# Patient Record
Sex: MALE | Race: WHITE | NOT HISPANIC OR LATINO | Employment: UNEMPLOYED | URBAN - METROPOLITAN AREA
[De-identification: names, ages, dates, MRNs, and addresses within clinical notes are randomized per-mention and may not be internally consistent; named-entity substitution may affect disease eponyms.]

---

## 2023-03-13 ENCOUNTER — OFFICE VISIT (OUTPATIENT)
Dept: INTERNAL MEDICINE | Facility: CLINIC | Age: 53
End: 2023-03-13
Payer: MEDICAID

## 2023-03-13 VITALS
RESPIRATION RATE: 18 BRPM | HEIGHT: 71 IN | HEART RATE: 78 BPM | TEMPERATURE: 98.4 F | SYSTOLIC BLOOD PRESSURE: 152 MMHG | DIASTOLIC BLOOD PRESSURE: 102 MMHG

## 2023-03-13 DIAGNOSIS — Z13.9 ENCOUNTER FOR SCREENING: ICD-10-CM

## 2023-03-13 DIAGNOSIS — L30.4 INTERTRIGO: ICD-10-CM

## 2023-03-13 DIAGNOSIS — Z12.5 SCREENING PSA (PROSTATE SPECIFIC ANTIGEN): ICD-10-CM

## 2023-03-13 DIAGNOSIS — I10 PRIMARY HYPERTENSION: ICD-10-CM

## 2023-03-13 DIAGNOSIS — M51.37 DEGENERATIVE DISC DISEASE AT L5-S1 LEVEL: ICD-10-CM

## 2023-03-13 DIAGNOSIS — M12.812 ROTATOR CUFF ARTHROPATHY OF BOTH SHOULDERS: ICD-10-CM

## 2023-03-13 DIAGNOSIS — M12.811 ROTATOR CUFF ARTHROPATHY OF BOTH SHOULDERS: ICD-10-CM

## 2023-03-13 DIAGNOSIS — E03.8 OTHER SPECIFIED HYPOTHYROIDISM: ICD-10-CM

## 2023-03-13 DIAGNOSIS — E66.01 CLASS 3 SEVERE OBESITY DUE TO EXCESS CALORIES WITH SERIOUS COMORBIDITY AND BODY MASS INDEX (BMI) OF 40.0 TO 44.9 IN ADULT: ICD-10-CM

## 2023-03-13 DIAGNOSIS — Z12.11 SCREEN FOR COLON CANCER: ICD-10-CM

## 2023-03-13 DIAGNOSIS — Z00.00 HEALTHCARE MAINTENANCE: Primary | ICD-10-CM

## 2023-03-13 PROBLEM — M51.379 DEGENERATIVE DISC DISEASE AT L5-S1 LEVEL: Status: ACTIVE | Noted: 2023-03-13

## 2023-03-13 PROBLEM — M54.50 CHRONIC MIDLINE LOW BACK PAIN WITHOUT SCIATICA: Status: ACTIVE | Noted: 2023-03-13

## 2023-03-13 PROBLEM — E66.813 CLASS 3 SEVERE OBESITY DUE TO EXCESS CALORIES WITH SERIOUS COMORBIDITY AND BODY MASS INDEX (BMI) OF 40.0 TO 44.9 IN ADULT: Status: ACTIVE | Noted: 2023-03-13

## 2023-03-13 PROBLEM — G89.29 CHRONIC MIDLINE LOW BACK PAIN WITHOUT SCIATICA: Status: ACTIVE | Noted: 2023-03-13

## 2023-03-13 LAB
EXPIRATION DATE: NORMAL
HBA1C MFR BLD: 5.5 %
Lab: NORMAL

## 2023-03-13 PROCEDURE — 3044F HG A1C LEVEL LT 7.0%: CPT | Performed by: STUDENT IN AN ORGANIZED HEALTH CARE EDUCATION/TRAINING PROGRAM

## 2023-03-13 PROCEDURE — 3080F DIAST BP >= 90 MM HG: CPT | Performed by: STUDENT IN AN ORGANIZED HEALTH CARE EDUCATION/TRAINING PROGRAM

## 2023-03-13 PROCEDURE — 1160F RVW MEDS BY RX/DR IN RCRD: CPT | Performed by: STUDENT IN AN ORGANIZED HEALTH CARE EDUCATION/TRAINING PROGRAM

## 2023-03-13 PROCEDURE — 3077F SYST BP >= 140 MM HG: CPT | Performed by: STUDENT IN AN ORGANIZED HEALTH CARE EDUCATION/TRAINING PROGRAM

## 2023-03-13 PROCEDURE — 1159F MED LIST DOCD IN RCRD: CPT | Performed by: STUDENT IN AN ORGANIZED HEALTH CARE EDUCATION/TRAINING PROGRAM

## 2023-03-13 PROCEDURE — G0103 PSA SCREENING: HCPCS | Performed by: STUDENT IN AN ORGANIZED HEALTH CARE EDUCATION/TRAINING PROGRAM

## 2023-03-13 PROCEDURE — 3008F BODY MASS INDEX DOCD: CPT | Performed by: STUDENT IN AN ORGANIZED HEALTH CARE EDUCATION/TRAINING PROGRAM

## 2023-03-13 PROCEDURE — 83036 HEMOGLOBIN GLYCOSYLATED A1C: CPT | Performed by: STUDENT IN AN ORGANIZED HEALTH CARE EDUCATION/TRAINING PROGRAM

## 2023-03-13 PROCEDURE — 84443 ASSAY THYROID STIM HORMONE: CPT | Performed by: STUDENT IN AN ORGANIZED HEALTH CARE EDUCATION/TRAINING PROGRAM

## 2023-03-13 PROCEDURE — 2014F MENTAL STATUS ASSESS: CPT | Performed by: STUDENT IN AN ORGANIZED HEALTH CARE EDUCATION/TRAINING PROGRAM

## 2023-03-13 PROCEDURE — 80053 COMPREHEN METABOLIC PANEL: CPT | Performed by: STUDENT IN AN ORGANIZED HEALTH CARE EDUCATION/TRAINING PROGRAM

## 2023-03-13 PROCEDURE — 86803 HEPATITIS C AB TEST: CPT | Performed by: STUDENT IN AN ORGANIZED HEALTH CARE EDUCATION/TRAINING PROGRAM

## 2023-03-13 PROCEDURE — 80061 LIPID PANEL: CPT | Performed by: STUDENT IN AN ORGANIZED HEALTH CARE EDUCATION/TRAINING PROGRAM

## 2023-03-13 PROCEDURE — 99386 PREV VISIT NEW AGE 40-64: CPT | Performed by: STUDENT IN AN ORGANIZED HEALTH CARE EDUCATION/TRAINING PROGRAM

## 2023-03-13 RX ORDER — CLOTRIMAZOLE 1 %
1 CREAM (GRAM) TOPICAL 2 TIMES DAILY
Qty: 56 G | Refills: 0 | Status: SHIPPED | OUTPATIENT
Start: 2023-03-13 | End: 2023-04-10

## 2023-03-13 RX ORDER — MELOXICAM 7.5 MG/1
7.5 TABLET ORAL DAILY
Qty: 30 TABLET | Refills: 1 | Status: SHIPPED | OUTPATIENT
Start: 2023-03-13 | End: 2023-03-13

## 2023-03-13 RX ORDER — TELMISARTAN 40 MG/1
40 TABLET ORAL DAILY
Qty: 30 TABLET | Refills: 1 | Status: SHIPPED | OUTPATIENT
Start: 2023-03-13 | End: 2023-03-24 | Stop reason: SDUPTHER

## 2023-03-13 RX ORDER — IBUPROFEN 200 MG
200 TABLET ORAL EVERY 6 HOURS PRN
COMMUNITY
End: 2023-03-13

## 2023-03-13 RX ORDER — CLOTRIMAZOLE 1 %
1 CREAM (GRAM) TOPICAL 2 TIMES DAILY
Qty: 56 G | Refills: 0 | Status: SHIPPED | OUTPATIENT
Start: 2023-03-13 | End: 2023-03-13

## 2023-03-13 RX ORDER — MELOXICAM 7.5 MG/1
7.5 TABLET ORAL DAILY
Qty: 30 TABLET | Refills: 1 | Status: SHIPPED | OUTPATIENT
Start: 2023-03-13 | End: 2023-03-24 | Stop reason: SDUPTHER

## 2023-03-13 RX ORDER — ACETAMINOPHEN 500 MG
500 TABLET ORAL EVERY 6 HOURS PRN
COMMUNITY
End: 2023-03-13

## 2023-03-13 RX ORDER — TELMISARTAN 40 MG/1
40 TABLET ORAL DAILY
Qty: 30 TABLET | Refills: 1 | Status: SHIPPED | OUTPATIENT
Start: 2023-03-13 | End: 2023-03-13

## 2023-03-13 NOTE — PATIENT INSTRUCTIONS
"DASH Eating Plan  DASH stands for Dietary Approaches to Stop Hypertension. The DASH eating plan is a healthy eating plan that has been shown to:  Reduce high blood pressure (hypertension).  Reduce your risk for type 2 diabetes, heart disease, and stroke.  Help with weight loss.  What are tips for following this plan?  Reading food labels  Check food labels for the amount of salt (sodium) per serving. Choose foods with less than 5 percent of the Daily Value of sodium. Generally, foods with less than 300 milligrams (mg) of sodium per serving fit into this eating plan.  To find whole grains, look for the word \"whole\" as the first word in the ingredient list.  Shopping  Buy products labeled as \"low-sodium\" or \"no salt added.\"  Buy fresh foods. Avoid canned foods and pre-made or frozen meals.  Cooking  Avoid adding salt when cooking. Use salt-free seasonings or herbs instead of table salt or sea salt. Check with your health care provider or pharmacist before using salt substitutes.  Do not [place] foods. Cook foods using healthy methods such as baking, boiling, grilling, roasting, and broiling instead.  Cook with heart-healthy oils, such as olive, canola, avocado, soybean, or sunflower oil.  Meal planning  Eat a balanced diet that includes:  4 or more servings of fruits and 4 or more servings of vegetables each day. Try to fill one-half of your plate with fruits and vegetables.  6-8 servings of whole grains each day.  Less than 6 oz (170 g) of lean meat, poultry, or fish each day. A 3-oz (85-g) serving of meat is about the same size as a deck of cards. One egg equals 1 oz (28 g).  2-3 servings of low-fat dairy each day. One serving is 1 cup (237 mL).  1 serving of nuts, seeds, or beans 5 times each week.  2-3 servings of heart-healthy fats. Healthy fats called omega-3 fatty acids are found in foods such as walnuts, flaxseeds, fortified milks, and eggs. These fats are also found in cold-water fish, such as sardines, salmon, " and mackerel.  Limit how much you eat of:  Canned or prepackaged foods.  Food that is high in trans fat, such as some fried foods.  Food that is high in saturated fat, such as fatty meat.  Desserts and other sweets, sugary drinks, and other foods with added sugar.  Full-fat dairy products.  Do not salt foods before eating.  Do not eat more than 4 egg yolks a week.  Try to eat at least 2 vegetarian meals a week.  Eat more home-cooked food and less restaurant, buffet, and fast food.  Lifestyle  When eating at a restaurant, ask that your food be prepared with less salt or no salt, if possible.  If you drink alcohol:  Limit how much you use to:  0-1 drink a day for women who are not pregnant.  0-2 drinks a day for men.  Be aware of how much alcohol is in your drink. In the [place] one drink equals one 12 oz bottle of beer (355 mL), one 5 oz glass of wine (148 mL), or one 1½ oz glass of hard liquor (44 mL).  General information  Avoid eating more than 2,300 mg of salt a day. If you have hypertension, you may need to reduce your sodium intake to 1,500 mg a day.  Work with your health care provider to maintain a healthy body weight or to lose weight. Ask what an ideal weight is for you.  Get at least 30 minutes of exercise that causes your heart to beat faster (aerobic exercise) most days of the week. Activities may include walking, swimming, or biking.  Work with your health care provider or dietitian to adjust your eating plan to your individual calorie needs.  What foods should I eat?  Fruits  All fresh, dried, or frozen fruit. Canned fruit in natural juice (without added sugar).  Vegetables  Fresh or frozen vegetables (raw, steamed, roasted, or grilled). Low-sodium or reduced-sodium tomato and vegetable juice. Low-sodium or reduced-sodium tomato sauce and tomato paste. Low-sodium or reduced-sodium canned vegetables.  Grains  Whole-grain or whole-wheat bread. Whole-grain or whole-wheat pasta. Brown rice. Oatmeal. Quinoa.  Bulgur. Whole-grain and low-sodium cereals. Deena bread. Low-fat, low-sodium crackers. Whole-wheat flour tortillas.  Meats and other proteins  Skinless chicken or turkey. Ground chicken or turkey. Pork with fat trimmed off. Fish and seafood. Egg whites. Dried beans, peas, or lentils. Unsalted nuts, nut butters, and seeds. Unsalted canned beans. Lean cuts of beef with fat trimmed off. Low-sodium, lean precooked or cured meat, such as sausages or meat loaves.  Dairy  Low-fat (1%) or fat-free (skim) milk. Reduced-fat, low-fat, or fat-free cheeses. Nonfat, low-sodium ricotta or cottage cheese. Low-fat or nonfat yogurt. Low-fat, low-sodium cheese.  Fats and oils  Soft margarine without trans fats. Vegetable oil. Reduced-fat, low-fat, or light mayonnaise and salad dressings (reduced-sodium). Canola, safflower, olive, avocado, soybean, and sunflower oils. Avocado.  Seasonings and condiments  Herbs. Spices. Seasoning mixes without salt.  Other foods  Unsalted popcorn and pretzels. Fat-free sweets.  The items listed above may not be a complete list of foods and beverages you can eat. Contact a dietitian for more information.  What foods should I avoid?  Fruits  Canned fruit in a light or heavy syrup. Fried fruit. Fruit in cream or butter sauce.  Vegetables  Creamed or fried vegetables. Vegetables in a cheese sauce. Regular canned vegetables (not low-sodium or reduced-sodium). Regular canned tomato sauce and paste (not low-sodium or reduced-sodium). Regular tomato and vegetable juice (not low-sodium or reduced-sodium). Pickles. Olives.  Grains  Baked goods made with fat, such as croissants, muffins, or some breads. Dry pasta or rice meal packs.  Meats and other proteins  Fatty cuts of meat. Ribs. Fried meat. Nagel. Bologna, salami, and other precooked or cured meats, such as sausages or meat loaves. Fat from the back of a pig (fatback). Bratwurst. Salted nuts and seeds. Canned beans with added salt. Canned or smoked fish.  Whole eggs or egg yolks. Chicken or turkey with skin.  Dairy  Whole or 2% milk, cream, and half-and-half. Whole or full-fat cream cheese. Whole-fat or sweetened yogurt. Full-fat cheese. Nondairy creamers. Whipped toppings. Processed cheese and cheese spreads.  Fats and oils  Butter. Stick margarine. Lard. Shortening. Ghee. Nagel fat. Tropical oils, such as coconut, palm kernel, or palm oil.  Seasonings and condiments  Onion salt, garlic salt, seasoned salt, table salt, and sea salt. Worcestershire sauce. Tartar sauce. Barbecue sauce. Teriyaki sauce. Soy sauce, including reduced-sodium. Steak sauce. Canned and packaged gravies. Fish sauce. Oyster sauce. Cocktail sauce. Store-bought horseradish. Ketchup. Mustard. Meat flavorings and tenderizers. Bouillon cubes. Hot sauces. Pre-made or packaged marinades. Pre-made or packaged taco seasonings. Relishes. Regular salad dressings.  Other foods  Salted popcorn and pretzels.  The items listed above may not be a complete list of foods and beverages you should avoid. Contact a dietitian for more information.  Where to find more information  National Heart, Lung, and Blood [place]: www.nhlbi.nih.gov  American Heart Association: www.heart.org  Academy of Nutrition and Dietetics: www.eatright.org  National Kidney Foundation: www.kidney.org  Summary  The DASH eating plan is a healthy eating plan that has been shown to reduce high blood pressure (hypertension). It may also reduce your risk for type 2 diabetes, heart disease, and stroke.  When on the DASH eating plan, aim to eat more fresh fruits and vegetables, whole grains, lean proteins, low-fat dairy, and heart-healthy fats.  With the DASH eating plan, you should limit salt (sodium) intake to 2,300 mg a day. If you have hypertension, you may need to reduce your sodium intake to 1,500 mg a day.  Work with your health care provider or dietitian to adjust your eating plan to your individual calorie needs.  This information is not  intended to replace advice given to you by your health care provider. Make sure you discuss any questions you have with your health care provider.  Document Revised: 11/20/2020 Document Reviewed: 11/20/2020  Elsevier Patient Education © 2022 [employer] Inc.

## 2023-03-13 NOTE — PROGRESS NOTES
New Patient Office Visit      Date: 2023   Patient Name: Greg Kee  : 1970   MRN: [id number]     Chief Complaint:    Chief Complaint   Patient presents with   • Back Pain   • Shoulder Pain     Establish care.    • Hypertension       History of Present Illness: Greg Kee is a 52 y.o. male who is here today to establish care.      Hypertension  The patient reports that his blood pressure ranges from 140 mmHg to 160 mmHg on the systolic portion. He states that he did not pass your 's test due to his blood pressure being elevated.     Headache  The patient complains of headaches often. He states that he is not aware if it is associated with his blood pressure.     Back pain  The patient reports that the pain is getting worse. He states that he can not walk more than half a mile due to his back pain. He confirms that the back pain has been present for 2 years or more. He denies of any injury. The patient states that his family doctor in [place] was Dr. Hughes. He reports that he had completed a CAT scan, MRI, and x-rays. He states that the report read how his nerve on L5 and 2 more nerves that were severely damaged. The patient was offered to get surgery or a pain injection. He denies getting the treatment. The patient inquires about rechecking his back and getting treatment. He confirms that he has done physical therapy once. He states the physical therapy did not help the pain. He inquires about getting medication due to taking 8 200mg tablets of ibuprofen daily. The patient states the ibuprofen is not working.     Shoulder pain  The patient reports that Dr. Wallace gives an injection in his bilateral shoulders every summer. He states that he works 25 hour days with no lunch. He notes that the pain is getting worse. He inquires about treatment and getting injections. The patient complains of getting no sleep due to the pain. He notes that he has used Voltran gel that  has helped alleviate some of the pain.     Rash  The patient reports that he has a rash in his groin area. He states that he has seen a dermatologist previously. The dermatologist did not know what the rash was according to the patient. He has tried several medications which has not helped. He denies the medication helping the irritation.     Social  He notes that he moved here from [place]. 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 The patient states that he lives in [place]. He confirms he is not a smoker.     Health maintenance  The patient states that he would like the physician to get all his records. He denies COVID-19, tetanus, shingles, and influenza vaccinations. He denies any colon cancer screening. He inquires about the colon screening procedure. The patient reports taking his wife's thyroid medication. He states that when he does not take it, he does not have a bowel movement. He denies of having any thyroid issues. He denies of having any issues with going to the restroom. He inquires about checking his thyroid. The patient states that he drink apple cider daily. He denies of having a dentist and ophthalmologist. He states that denies cataract surgery. The patient has earrings aids but does not wear them. He does not diet and exercise. He inquires about gastric bypass.       Subjective      Review of Systems:   Review of Systems   Constitutional: Negative for activity change, appetite change, fatigue and fever.   Eyes: Negative for blurred vision, photophobia and visual disturbance.   Respiratory: Negative for cough, chest tightness and shortness of breath.    Cardiovascular: Negative for chest pain and palpitations.   Gastrointestinal: Negative for abdominal distention, abdominal pain, blood in stool, constipation, diarrhea, nausea and vomiting.   Genitourinary: Negative for dysuria and hematuria.   Musculoskeletal: Positive for arthralgias and back pain. Negative for joint swelling.   Skin: Positive for rash.  Negative for wound.   Neurological: Positive for headache. Negative for weakness and confusion.       Past Medical History: History reviewed. No pertinent past medical history.    Past Surgical History: History reviewed. No pertinent surgical history.    Family History:   Family History   Problem Relation Age of Onset   • Prostate cancer Father        Social History:   Social History     Socioeconomic History   • Marital status:    Tobacco Use   • Smoking status: Never     Passive exposure: Never   • Smokeless tobacco: Never   Vaping Use   • Vaping Use: Never used   Substance and Sexual Activity   • Alcohol use: Never   • Drug use: Never       Medications:     Current Outpatient Medications:   •  clotrimazole (LOTRIMIN) 1 % cream, Apply 1 application topically to the appropriate area as directed 2 (Two) Times a Day for 28 days., Disp: 56 g, Rfl: 0  •  meloxicam (Mobic) 7.5 MG tablet, Take 1 tablet by mouth Daily., Disp: 30 tablet, Rfl: 1  •  telmisartan (MICARDIS) 40 MG tablet, Take 1 tablet by mouth Daily., Disp: 30 tablet, Rfl: 1    Allergies:   No Known Allergies    Immunizations:    There is no immunization history on file for this patient.     Colorectal Screening: Up-to-date  Last Completed Colonoscopy     This patient has no relevant Health Maintenance data.        CT for Smoker (Age 50-80, 20pk yr within last 15 years): N/A  Bone Density/DEXA (high risk): Not applicable  Hep C (Age 18-79 once): ordered  HIV (Age 15-65 once) : N/A  PSA (Over age 50, C Level Recommendation): Ordered  US Aorta (For male smokers, age 65): Not applicable  A1c: Ordered  Lipid panel: Ordered      Dermatology: declined  Ophthalmologist: declined  Dentist: declined    Tobacco Use: Low Risk    • Smoking Tobacco Use: Never   • Smokeless Tobacco Use: Never   • Passive Exposure: Never       Social History     Substance and Sexual Activity   Alcohol Use Never        Social History     Substance and Sexual Activity   Drug Use Never  "       Diet/Physical activity: Counseled on 03/14/23    Sexual health: No concerns, contraception used    PHQ-2 Depression Screening  PHQ-9 Total Score: 0       Intimate partner violence: (Screen on initial visit, older adult with injury or evidence of neglect): No concerns  • Violence can be a problem in many people's lives, so I now ask every patient about trauma or abuse they may have experienced in a relationship.  • Stress/Safety - Do you feel safe in your relationship?  • Afraid/Abused - Have you ever been in a relationship where you were threatened, hurt, or afraid?  • Friend/Family - Are your friends aware you have been hurt?  • Emergency Plan - Do you have a safe place to go and the resources you need in an emergency?    Osteoporosis: No concerns  • Men: history of low trauma fracture, androgen deprivation therapy for prostate cancer, hypogonadism, primary hyperparathyroidism, intestinal disorders.     Objective     Physical Exam:  Vital Signs:   Vitals:    03/13/23 1104   BP: (!) 152/102   BP Location: Right arm   Patient Position: Sitting   Cuff Size: Adult   Pulse: 78   Resp: 18   Temp: 98.4 °F (36.9 °C)   TempSrc: Temporal   Height: 180.3 cm (71\")   PainSc:   4   PainLoc: Back     There is no height or weight on file to calculate BMI.    Physical Exam  Vitals and nursing note reviewed.   Constitutional:       General: He is not in acute distress.     Appearance: Normal appearance. He is obese. He is not ill-appearing or toxic-appearing.   HENT:      Nose: No congestion or rhinorrhea.   Eyes:      General:         Right eye: No discharge.         Left eye: No discharge.      Conjunctiva/sclera: Conjunctivae normal.   Cardiovascular:      Rate and Rhythm: Normal rate and regular rhythm.      Heart sounds: Normal heart sounds. No murmur heard.    No friction rub.   Pulmonary:      Effort: Pulmonary effort is normal. No respiratory distress.      Breath sounds: Normal breath sounds. No wheezing or rhonchi. "   Abdominal:      General: Abdomen is flat. Bowel sounds are normal. There is no distension.      Palpations: Abdomen is soft. There is no mass.      Tenderness: There is no abdominal tenderness. There is no guarding or rebound.   Musculoskeletal:      Cervical back: Normal range of motion.      Right lower leg: No edema.      Left lower leg: No edema.   Skin:     Findings: No lesion or rash.   Neurological:      General: No focal deficit present.      Mental Status: He is alert. Mental status is at baseline.      Coordination: Coordination normal.      Gait: Gait normal.   Psychiatric:         Mood and Affect: Mood normal.         Behavior: Behavior normal.         Thought Content: Thought content normal.         Judgment: Judgment normal.         Procedures    Results:     Labs:   Hemoglobin A1C   Date Value Ref Range Status   03/13/2023 5.5 % Final     TSH   Date Value Ref Range Status   03/13/2023 1.680 0.270 - 4.200 uIU/mL Final        Imaging:   No valid procedures specified.     Assessment / Plan      Assessment/Plan:   Problem List Items Addressed This Visit        Cardiac and Vasculature    Primary hypertension    Relevant Medications    telmisartan (MICARDIS) 40 MG tablet       Endocrine and Metabolic    Other specified hypothyroidism    Relevant Orders    TSH (Completed)    Class 3 severe obesity due to excess calories with serious comorbidity and body mass index (BMI) of 40.0 to 44.9 in adult (HCC)       Musculoskeletal and Injuries    Rotator cuff arthropathy of both shoulders    Relevant Medications    meloxicam (Mobic) 7.5 MG tablet    Other Relevant Orders    Ambulatory Referral to Physical Therapy Evaluate and treat    Ambulatory Referral to Pain Management (Completed)       Neuro    Degenerative disc disease at L5-S1 level    Relevant Medications    meloxicam (Mobic) 7.5 MG tablet    Other Relevant Orders    Ambulatory Referral to Physical Therapy Evaluate and treat    Ambulatory Referral to Pain  Management (Completed)       Skin    Intertrigo    Relevant Medications    clotrimazole (LOTRIMIN) 1 % cream   Other Visit Diagnoses     Healthcare maintenance    -  Primary    Relevant Orders    Lipid Panel (Completed)    POC Glycosylated Hemoglobin (Hb A1C) (Completed)    Comprehensive Metabolic Panel (Completed)    Screen for colon cancer        Relevant Orders    Cologuard - Stool, Per Rectum    Screening PSA (prostate specific antigen)        Relevant Orders    PSA Screen (Completed)    Encounter for screening        Relevant Orders    Hepatitis C Antibody (Completed)        1. Rotator cuff arthropathy of both shoulders  - Discussed in great detail about a treatment plan.  - Referral sent to physical therapy.   - Referral sent to pain management.   - meloxicam sent to pharmacy.    2. Degenerative disc disease at L5-S1 level  - Discussed in great detail about a treatment plan.  - Referral sent to physical therapy.   - Referral sent to pain management.   - meloxicam sent to pharmacy.     3. Hypertension  - Medication sent to pharmacy.     4. Class 3 severe obesity due to excess calories with serious comorbidity and body mass index (BMI) of 40.0 to 44.9 in adult  - The patient was recommended to start a low salt diet.  - He was provided with educational information to take home with him.    5. Other specified hypothyroidism  - Blood work ordered.     6. Intertrigo  - clotrimazole (LOTRIMIN) 1 % cream sent to pharmacy.     7. Health maintenance  - Blood work ordered for yearly.     8. Screening for colon cancer  - Cologuard ordered.  - Discussed with patient on how to perform the test.    9. Screening for prostate cancer  - Blood work ordered.       Healthcare Maintenance:  Counseling provided based on age appropriate USPSTF guidelines.  BMI cannot be calculated due to outdated height or weight values.  Please input a current height/weight in Vitals and re-renter [id number] in Note to pull in correct documentation  based on BMI range.    Greg Kee voices understanding and acceptance of this advice and will call back with any further questions or concerns. AVS with preventive healthcare tips printed for patient.     “Discussed risks/benefits to vaccination, reviewed components of the vaccine, discussed VIS, discussed informed consent, informed consent obtained. Patient/Parent was allowed to accept or refuse vaccine. Questions answered to satisfactory state of patient/Parent. We reviewed typical age appropriate and seasonally appropriate vaccinations. Reviewed immunization history and updated state vaccination form as needed. Patient was counseled on COVID-19 Bivalent  Influenza  Zoster      Follow Up:   Return in about 4 weeks (around 4/10/2023) for Recheck.      Scooby Ochoa MD  [facility]     Transcribed from ambient dictation for Scooby Ochoa MD by Fe Smith.  03/13/23   14:00 EDT    Patient or patient representative verbalized consent to the visit recording.  I have personally performed the services described in this document as transcribed by the above individual, and it is both accurate and complete.

## 2023-03-14 ENCOUNTER — TELEPHONE (OUTPATIENT)
Dept: INTERNAL MEDICINE | Facility: CLINIC | Age: 53
End: 2023-03-14
Payer: MEDICAID

## 2023-03-14 DIAGNOSIS — E78.00 PURE HYPERCHOLESTEROLEMIA: Primary | ICD-10-CM

## 2023-03-14 LAB
ALBUMIN SERPL-MCNC: 4.5 G/DL (ref 3.5–5.2)
ALBUMIN/GLOB SERPL: 1.7 G/DL
ALP SERPL-CCNC: 74 U/L (ref 39–117)
ALT SERPL W P-5'-P-CCNC: 47 U/L (ref 1–41)
ANION GAP SERPL CALCULATED.3IONS-SCNC: 10.3 MMOL/L (ref 5–15)
AST SERPL-CCNC: 34 U/L (ref 1–40)
BILIRUB SERPL-MCNC: 0.7 MG/DL (ref 0–1.2)
BUN SERPL-MCNC: 9 MG/DL (ref 6–20)
BUN/CREAT SERPL: 10.3 (ref 7–25)
CALCIUM SPEC-SCNC: 9.7 MG/DL (ref 8.6–10.5)
CHLORIDE SERPL-SCNC: 106 MMOL/L (ref 98–107)
CHOLEST SERPL-MCNC: 195 MG/DL (ref 0–200)
CO2 SERPL-SCNC: 23.7 MMOL/L (ref 22–29)
CREAT SERPL-MCNC: 0.87 MG/DL (ref 0.76–1.27)
EGFRCR SERPLBLD CKD-EPI 2021: 102.5 ML/MIN/1.73
GLOBULIN UR ELPH-MCNC: 2.7 GM/DL
GLUCOSE SERPL-MCNC: 93 MG/DL (ref 65–99)
HCV AB SER DONR QL: NORMAL
HDLC SERPL-MCNC: 45 MG/DL (ref 40–60)
LDLC SERPL CALC-MCNC: 127 MG/DL (ref 0–100)
LDLC/HDLC SERPL: 2.77 {RATIO}
POTASSIUM SERPL-SCNC: 4.5 MMOL/L (ref 3.5–5.2)
PROT SERPL-MCNC: 7.2 G/DL (ref 6–8.5)
PSA SERPL-MCNC: 0.95 NG/ML (ref 0–4)
SODIUM SERPL-SCNC: 140 MMOL/L (ref 136–145)
TRIGL SERPL-MCNC: 126 MG/DL (ref 0–150)
TSH SERPL DL<=0.05 MIU/L-ACNC: 1.68 UIU/ML (ref 0.27–4.2)
VLDLC SERPL-MCNC: 23 MG/DL (ref 5–40)

## 2023-03-14 RX ORDER — ROSUVASTATIN CALCIUM 10 MG/1
10 TABLET, COATED ORAL DAILY
Qty: 90 TABLET | Refills: 3 | Status: SHIPPED | OUTPATIENT
Start: 2023-03-14

## 2023-03-14 RX ORDER — ROSUVASTATIN CALCIUM 10 MG/1
10 TABLET, COATED ORAL DAILY
Qty: 90 TABLET | Refills: 3 | Status: SHIPPED | OUTPATIENT
Start: 2023-03-14 | End: 2023-03-14

## 2023-03-14 NOTE — TELEPHONE ENCOUNTER
Statin initiated due to ASCVD risk as noted below.    The 10-year ASCVD risk score (Kanu BILLINGSLEY, et al., 2019) is: 8.6%    Values used to calculate the score:      Age: 52 years      Sex: Male      Is Non- : No      Diabetic: No      Tobacco smoker: No      Systolic Blood Pressure: 152 mmHg      Is BP treated: Yes      HDL Cholesterol: 45 mg/dL      Total Cholesterol: 195 mg/dL

## 2023-03-14 NOTE — TELEPHONE ENCOUNTER
Tried reaching Pt., no answer, no voice mail set up.    Hub please relay message  ----- Message from Scooby Ochoa MD sent at 3/14/2023 11:09 AM EDT -----  Please let the patient know that his labs are largely within normal limits, however his cholesterol panel indicates that his LDL is high.  He would meet criteria for statin therapy, so please let me know if he is interested in this medication and I can initiate it or call him to discuss more.  Thanks for coordinating.

## 2023-03-16 PROBLEM — M48.062 SPINAL STENOSIS OF LUMBAR REGION WITH NEUROGENIC CLAUDICATION: Status: ACTIVE | Noted: 2023-03-16

## 2023-03-24 ENCOUNTER — TELEPHONE (OUTPATIENT)
Dept: INTERNAL MEDICINE | Facility: CLINIC | Age: 53
End: 2023-03-24
Payer: MEDICAID

## 2023-03-24 DIAGNOSIS — M12.812 ROTATOR CUFF ARTHROPATHY OF BOTH SHOULDERS: ICD-10-CM

## 2023-03-24 DIAGNOSIS — M51.37 DEGENERATIVE DISC DISEASE AT L5-S1 LEVEL: ICD-10-CM

## 2023-03-24 DIAGNOSIS — M12.811 ROTATOR CUFF ARTHROPATHY OF BOTH SHOULDERS: ICD-10-CM

## 2023-03-24 DIAGNOSIS — I10 PRIMARY HYPERTENSION: ICD-10-CM

## 2023-03-27 RX ORDER — TELMISARTAN 40 MG/1
40 TABLET ORAL DAILY
Qty: 30 TABLET | Refills: 1 | Status: SHIPPED | OUTPATIENT
Start: 2023-03-27

## 2023-03-27 RX ORDER — MELOXICAM 15 MG/1
15 TABLET ORAL DAILY
Qty: 30 TABLET | Refills: 1 | Status: SHIPPED | OUTPATIENT
Start: 2023-03-27

## 2023-03-27 NOTE — TELEPHONE ENCOUNTER
Okay for the patient to take 2 of the previous prescription, but I sent a new prescription at the higher dose so he would only have to take 1 pill of the prescription that was just sent.  Let me know with other concerns prior to his next visit and thanks for coordinating.

## 2023-03-28 NOTE — TELEPHONE ENCOUNTER
Tried reaching Pt, not available, voice mail not set up.    Hub please relay message  Okay for the patient to take 2 of the previous prescription, but I sent a new prescription at the higher dose so he would only have to take 1 pill of the prescription that was just sent.  Let me know with other concerns prior to his next visit and thanks for coordinating

## 2023-03-29 NOTE — TELEPHONE ENCOUNTER
Called patient's daughter Susie and relayed provider's message, patient will  medication at the pharmacy.

## 2023-04-06 ENCOUNTER — TELEPHONE (OUTPATIENT)
Dept: INTERNAL MEDICINE | Facility: CLINIC | Age: 53
End: 2023-04-06
Payer: MEDICAID

## 2023-04-06 NOTE — TELEPHONE ENCOUNTER
Tried reaching Pt on [phone], not available, no voice mail set up.    Hub please relay message  ----- Message from Scooby Ochoa MD sent at 4/5/2023  3:00 PM EDT -----  Please let the patient know that his screening was negative for colon cancer.  No additional interventions are needed other than screening every 3 years.  Let me know with other questions and thanks for coordinating.

## 2023-04-21 DIAGNOSIS — I10 PRIMARY HYPERTENSION: ICD-10-CM

## 2023-04-21 RX ORDER — TELMISARTAN 40 MG/1
40 TABLET ORAL DAILY
Qty: 30 TABLET | Refills: 1 | Status: SHIPPED | OUTPATIENT
Start: 2023-04-21

## 2023-04-21 NOTE — TELEPHONE ENCOUNTER
Caller: Greg Kee    Relationship: Self    Best call back number: [phone]    Requested Prescriptions:   Requested Prescriptions     Pending Prescriptions Disp Refills   • telmisartan (MICARDIS) 40 MG tablet 30 tablet 1     Sig: Take 1 tablet by mouth Daily.        Pharmacy where request should be sent: [facility] [address] [place] [address] [phone] [facility] [phone]      Last office visit with prescribing clinician: 3/13/2023   Last telemedicine visit with prescribing clinician: 4/26/2023   Next office visit with prescribing clinician: 4/26/2023     Additional details provided by patient: PATIENT STATES HE IS COMPLETELY OUT.    Does the patient have less than a 3 day supply:  [x] Yes  [] No      Omid Mccormack Rep   04/21/23 10:47 EDT

## 2023-04-26 ENCOUNTER — OFFICE VISIT (OUTPATIENT)
Dept: INTERNAL MEDICINE | Facility: CLINIC | Age: 53
End: 2023-04-26
Payer: MEDICAID

## 2023-04-26 VITALS
TEMPERATURE: 98.4 F | HEART RATE: 88 BPM | DIASTOLIC BLOOD PRESSURE: 96 MMHG | WEIGHT: 315 LBS | SYSTOLIC BLOOD PRESSURE: 138 MMHG | BODY MASS INDEX: 44.46 KG/M2 | RESPIRATION RATE: 18 BRPM

## 2023-04-26 DIAGNOSIS — L30.4 INTERTRIGO: ICD-10-CM

## 2023-04-26 DIAGNOSIS — M51.37 DEGENERATIVE DISC DISEASE AT L5-S1 LEVEL: ICD-10-CM

## 2023-04-26 DIAGNOSIS — I10 PRIMARY HYPERTENSION: Primary | ICD-10-CM

## 2023-04-26 DIAGNOSIS — M12.811 ROTATOR CUFF ARTHROPATHY OF BOTH SHOULDERS: ICD-10-CM

## 2023-04-26 DIAGNOSIS — Z23 ENCOUNTER FOR VACCINATION: ICD-10-CM

## 2023-04-26 DIAGNOSIS — E66.01 CLASS 3 SEVERE OBESITY DUE TO EXCESS CALORIES WITH SERIOUS COMORBIDITY AND BODY MASS INDEX (BMI) OF 40.0 TO 44.9 IN ADULT: ICD-10-CM

## 2023-04-26 DIAGNOSIS — E78.00 PURE HYPERCHOLESTEROLEMIA: ICD-10-CM

## 2023-04-26 DIAGNOSIS — M48.062 SPINAL STENOSIS OF LUMBAR REGION WITH NEUROGENIC CLAUDICATION: ICD-10-CM

## 2023-04-26 DIAGNOSIS — M12.812 ROTATOR CUFF ARTHROPATHY OF BOTH SHOULDERS: ICD-10-CM

## 2023-04-26 RX ORDER — OLMESARTAN MEDOXOMIL 40 MG/1
40 TABLET ORAL DAILY
Qty: 90 TABLET | Refills: 3 | Status: SHIPPED | OUTPATIENT
Start: 2023-04-26

## 2023-04-26 NOTE — PROGRESS NOTES
Follow Up Office Visit      Date: 2023   Patient Name: Greg Kee  : 1970   MRN: [id number]     Chief Complaint:    Chief Complaint   Patient presents with   • Hypertension     fu       History of Present Illness: Greg Kee is a 52 y.o. male who is here today for a follow up appointment.     They are here today for a follow up appointment.     Greg Trejo presents today for a follow up appointment.     The patient states he is doing well since his last visit. He inquired what time of day he should take his rosuvastatin. He has been out of his blood pressure medication for 2 weeks due to insurance coverage.     The patient is also taking Meloxicam for his back pain, he has degenerative disc disease and spinal stenosis. He states it is not working well for them and wants to know if he can increase his dose from once a day to 2 or 3 times per day. He has done physical therapy in the past and it made the pain worse. His next appointment with his spine and pain management clinic is on 2023. He also has pain in his shoulders but will address that at the same appointment.     The patient states he has a headache. He regularly drinks coffee but did not have any today. He inquires if he can still drink his coffee with having high blood pressure.     The patient states that the rash is still there intermittently. He says the location that is troubling him the most is in his groin and under his testicles. He says he saw a dermatologist in [place]. He put a cream on the area but stopped using it and the rash came back. He says the area will sweat and that causes it to return. He states he has used Dipesh in the past and it did not help. He states that he has been using the same cream from his rash on his nails. He states he cut one of the nails off.     The patient states that when he sweats, it has a very foul smell to it. He is unsure if he should change his soap or if there  is anything else he can do.     The patient had inquired about weight loss during his last visit. He is able to take a weight loss program and discuss medications if needed. He states he is currently trying to follow a Keto diet. He has loss 2 lbs in 1 week.     The 10-year ASCVD risk score (Kanu BILLINGSLEY, et al., 2019) is: 7.3%    Values used to calculate the score:      Age: 52 years      Sex: Male      Is Non- : No      Diabetic: No      Tobacco smoker: No      Systolic Blood Pressure: 138 mmHg      Is BP treated: Yes      HDL Cholesterol: 45 mg/dL      Total Cholesterol: 195 mg/dL      Subjective      Review of Systems:   Review of Systems   Constitutional: Negative for activity change, appetite change, fatigue and fever.   Eyes: Negative for blurred vision, photophobia and visual disturbance.   Respiratory: Negative for cough, chest tightness and shortness of breath.    Cardiovascular: Negative for chest pain and palpitations.   Gastrointestinal: Negative for abdominal distention, abdominal pain, blood in stool, constipation, diarrhea, nausea and vomiting.   Genitourinary: Negative for dysuria and hematuria.   Musculoskeletal: Positive for back pain. Negative for arthralgias and joint swelling.   Skin: Positive for rash. Negative for wound.   Neurological: Negative for weakness, headache and confusion.       I have reviewed the patients family history, social history, past medical history, past surgical history and have updated it as appropriate.     Medications:     Current Outpatient Medications:   •  meloxicam (Mobic) 15 MG tablet, Take 1 tablet by mouth Daily., Disp: 30 tablet, Rfl: 1  •  rosuvastatin (Crestor) 10 MG tablet, Take 1 tablet by mouth Daily., Disp: 90 tablet, Rfl: 3  •  olmesartan (BENICAR) 40 MG tablet, Take 1 tablet by mouth Daily., Disp: 90 tablet, Rfl: 3    Allergies:   No Known Allergies    Objective     Physical Exam: Please see above  Vital Signs:   Vitals:     04/26/23 1113   BP: 138/96   BP Location: Right arm   Patient Position: Sitting   Cuff Size: Adult   Pulse: 88   Resp: 18   Temp: 98.4 °F (36.9 °C)   TempSrc: Temporal   Weight: (!) 145 kg (318 lb 12.8 oz)   PainSc: 0-No pain     Body mass index is 44.46 kg/m².  BMI cannot be calculated due to outdated height or weight values.  Please input a current height/weight in Vitals and re-renter BMIFOLLOWUP in Note to pull in correct documentation based on BMI range.       Physical Exam  Vitals and nursing note reviewed.   Constitutional:       General: He is not in acute distress.     Appearance: Normal appearance. He is obese. He is not ill-appearing or toxic-appearing.   HENT:      Nose: No congestion or rhinorrhea.   Eyes:      General:         Right eye: No discharge.         Left eye: No discharge.      Conjunctiva/sclera: Conjunctivae normal.   Pulmonary:      Effort: Pulmonary effort is normal. No respiratory distress.   Abdominal:      General: Abdomen is flat. There is no distension.   Musculoskeletal:      Cervical back: Normal range of motion.   Skin:     Coloration: Skin is not jaundiced.      Findings: No rash.   Neurological:      General: No focal deficit present.      Mental Status: He is alert. Mental status is at baseline.      Coordination: Coordination normal.      Gait: Gait normal.   Psychiatric:         Mood and Affect: Mood normal.         Behavior: Behavior normal.         Thought Content: Thought content normal.         Judgment: Judgment normal.         Procedures    Results:   Labs:   Hemoglobin A1C   Date Value Ref Range Status   03/13/2023 5.5 % Final     TSH   Date Value Ref Range Status   03/13/2023 1.680 0.270 - 4.200 uIU/mL Final        Imaging:   No valid procedures specified.     Assessment / Plan      Assessment/Plan:   Problem List Items Addressed This Visit        Cardiac and Vasculature    Primary hypertension - Primary    Relevant Medications    olmesartan (BENICAR) 40 MG tablet     Pure hypercholesterolemia    Current Assessment & Plan     Lipid abnormalities are unchanged.  Pharmacotherapy as ordered.  Lipids will be reassessed in 6 months.    The 10-year ASCVD risk score (Kanu DK, et al., 2019) is: 7.3%    Values used to calculate the score:      Age: 52 years      Sex: Male      Is Non- : No      Diabetic: No      Tobacco smoker: No      Systolic Blood Pressure: 138 mmHg      Is BP treated: Yes      HDL Cholesterol: 45 mg/dL      Total Cholesterol: 195 mg/dL              Endocrine and Metabolic    Class 3 severe obesity due to excess calories with serious comorbidity and body mass index (BMI) of 40.0 to 44.9 in adult       Musculoskeletal and Injuries    Rotator cuff arthropathy of both shoulders       Neuro    Degenerative disc disease at L5-S1 level    Current Assessment & Plan     - Discussed options of physical therapy, interventional pain specialist and speaking with a neurosurgeon         Spinal stenosis of lumbar region with neurogenic claudication       Skin    Intertrigo    Current Assessment & Plan     - continue with clotrimazole and desitin PRN        Other Visit Diagnoses     Encounter for vaccination                Follow Up:   Return in about 3 months (around 7/26/2023) for Recheck.        Transcribed from ambient dictation for Scooby Ochoa MD by Rosalinda Garcia.  04/26/23   14:49 EDT    Patient or patient representative verbalized consent to the visit recording.  I have personally performed the services described in this document as transcribed by the above individual, and it is both accurate and complete.    Scooby Ochoa MD  [facility]

## 2023-04-26 NOTE — ASSESSMENT & PLAN NOTE
- Discussed options of physical therapy, interventional pain specialist and speaking with a neurosurgeon

## 2023-04-27 NOTE — ASSESSMENT & PLAN NOTE
Lipid abnormalities are unchanged.  Pharmacotherapy as ordered.  Lipids will be reassessed in 6 months.    The 10-year ASCVD risk score (Kanu BILLINGSLEY, et al., 2019) is: 7.3%    Values used to calculate the score:      Age: 52 years      Sex: Male      Is Non- : No      Diabetic: No      Tobacco smoker: No      Systolic Blood Pressure: 138 mmHg      Is BP treated: Yes      HDL Cholesterol: 45 mg/dL      Total Cholesterol: 195 mg/dL

## 2023-05-02 ENCOUNTER — TELEPHONE (OUTPATIENT)
Dept: INTERNAL MEDICINE | Facility: CLINIC | Age: 53
End: 2023-05-02
Payer: MEDICAID

## 2023-05-17 ENCOUNTER — TELEPHONE (OUTPATIENT)
Dept: INTERNAL MEDICINE | Facility: CLINIC | Age: 53
End: 2023-05-17
Payer: MEDICAID

## 2023-05-17 DIAGNOSIS — L30.4 INTERTRIGO: Primary | ICD-10-CM

## 2023-05-17 RX ORDER — CLOTRIMAZOLE 1 %
1 CREAM (GRAM) TOPICAL 2 TIMES DAILY
Qty: 28 G | Refills: 3 | Status: SHIPPED | OUTPATIENT
Start: 2023-05-17

## 2023-05-17 NOTE — TELEPHONE ENCOUNTER
Caller: Greg Kee    Relationship: Self    Best call back number: [phone]    What medication are you requesting: CLOTRIMAZOLE 1% CREAM  What are your current symptoms: SKIN IRRITATION    Have you had these symptoms before:    [x] Yes  [] No    Have you been treated for these symptoms before:   [x] Yes  [] No    If a prescription is needed, what is your preferred pharmacy and phone number:    [facility] [phone]  Additional notes:    PATIENT NEEDS REFILL ON THIS MEDICATION HOWEVER NOT ON HIS MEDICATION LIST. PATIENT WOULD LIKE LARGER TUBES OR 3 TUBES. PLEASE ADVISE

## 2023-07-26 ENCOUNTER — OFFICE VISIT (OUTPATIENT)
Dept: INTERNAL MEDICINE | Facility: CLINIC | Age: 53
End: 2023-07-26
Payer: MEDICAID

## 2023-07-26 VITALS
BODY MASS INDEX: 42.01 KG/M2 | RESPIRATION RATE: 16 BRPM | TEMPERATURE: 99.1 F | WEIGHT: 301.2 LBS | DIASTOLIC BLOOD PRESSURE: 102 MMHG | SYSTOLIC BLOOD PRESSURE: 144 MMHG

## 2023-07-26 DIAGNOSIS — M51.37 DEGENERATIVE DISC DISEASE AT L5-S1 LEVEL: ICD-10-CM

## 2023-07-26 DIAGNOSIS — I10 PRIMARY HYPERTENSION: Primary | ICD-10-CM

## 2023-07-26 DIAGNOSIS — E66.01 CLASS 3 SEVERE OBESITY DUE TO EXCESS CALORIES WITH SERIOUS COMORBIDITY AND BODY MASS INDEX (BMI) OF 40.0 TO 44.9 IN ADULT: ICD-10-CM

## 2023-07-26 DIAGNOSIS — M48.062 SPINAL STENOSIS OF LUMBAR REGION WITH NEUROGENIC CLAUDICATION: ICD-10-CM

## 2023-07-26 DIAGNOSIS — Z23 ENCOUNTER FOR VACCINATION: ICD-10-CM

## 2023-07-26 DIAGNOSIS — Z41.2 ENCOUNTER FOR CIRCUMCISION: ICD-10-CM

## 2023-07-26 DIAGNOSIS — L30.4 INTERTRIGO: ICD-10-CM

## 2023-07-26 PROCEDURE — 99214 OFFICE O/P EST MOD 30 MIN: CPT | Performed by: STUDENT IN AN ORGANIZED HEALTH CARE EDUCATION/TRAINING PROGRAM

## 2023-07-26 PROCEDURE — 3080F DIAST BP >= 90 MM HG: CPT | Performed by: STUDENT IN AN ORGANIZED HEALTH CARE EDUCATION/TRAINING PROGRAM

## 2023-07-26 PROCEDURE — 1159F MED LIST DOCD IN RCRD: CPT | Performed by: STUDENT IN AN ORGANIZED HEALTH CARE EDUCATION/TRAINING PROGRAM

## 2023-07-26 PROCEDURE — 3077F SYST BP >= 140 MM HG: CPT | Performed by: STUDENT IN AN ORGANIZED HEALTH CARE EDUCATION/TRAINING PROGRAM

## 2023-07-26 PROCEDURE — 1160F RVW MEDS BY RX/DR IN RCRD: CPT | Performed by: STUDENT IN AN ORGANIZED HEALTH CARE EDUCATION/TRAINING PROGRAM

## 2023-07-26 RX ORDER — NIFEDIPINE 30 MG/1
30 TABLET, EXTENDED RELEASE ORAL DAILY
Qty: 60 TABLET | Refills: 1 | Status: SHIPPED | OUTPATIENT
Start: 2023-07-26

## 2023-07-26 RX ORDER — CLOTRIMAZOLE 1 %
1 CREAM (GRAM) TOPICAL 2 TIMES DAILY
Qty: 28 G | Refills: 3 | Status: SHIPPED | OUTPATIENT
Start: 2023-07-26

## 2023-07-26 RX ORDER — OLMESARTAN MEDOXOMIL 40 MG/1
40 TABLET ORAL DAILY
Qty: 90 TABLET | Refills: 3 | Status: SHIPPED | OUTPATIENT
Start: 2023-07-26

## 2023-07-26 NOTE — PATIENT INSTRUCTIONS
"DASH Eating Plan  DASH stands for Dietary Approaches to Stop Hypertension. The DASH eating plan is a healthy eating plan that has been shown to:  Reduce high blood pressure (hypertension).  Reduce your risk for type 2 diabetes, heart disease, and stroke.  Help with weight loss.  What are tips for following this plan?  Reading food labels  Check food labels for the amount of salt (sodium) per serving. Choose foods with less than 5 percent of the Daily Value of sodium. Generally, foods with less than 300 milligrams (mg) of sodium per serving fit into this eating plan.  To find whole grains, look for the word \"whole\" as the first word in the ingredient list.  Shopping  Buy products labeled as \"low-sodium\" or \"no salt added.\"  Buy fresh foods. Avoid canned foods and pre-made or frozen meals.  Cooking  Avoid adding salt when cooking. Use salt-free seasonings or herbs instead of table salt or sea salt. Check with your health care provider or pharmacist before using salt substitutes.  Do not [place] foods. Cook foods using healthy methods such as baking, boiling, grilling, roasting, and broiling instead.  Cook with heart-healthy oils, such as olive, canola, avocado, soybean, or sunflower oil.  Meal planning  Eat a balanced diet that includes:  4 or more servings of fruits and 4 or more servings of vegetables each day. Try to fill one-half of your plate with fruits and vegetables.  6-8 servings of whole grains each day.  Less than 6 oz (170 g) of lean meat, poultry, or fish each day. A 3-oz (85-g) serving of meat is about the same size as a deck of cards. One egg equals 1 oz (28 g).  2-3 servings of low-fat dairy each day. One serving is 1 cup (237 mL).  1 serving of nuts, seeds, or beans 5 times each week.  2-3 servings of heart-healthy fats. Healthy fats called omega-3 fatty acids are found in foods such as walnuts, flaxseeds, fortified milks, and eggs. These fats are also found in cold-water fish, such as sardines, salmon, " and mackerel.  Limit how much you eat of:  Canned or prepackaged foods.  Food that is high in trans fat, such as some fried foods.  Food that is high in saturated fat, such as fatty meat.  Desserts and other sweets, sugary drinks, and other foods with added sugar.  Full-fat dairy products.  Do not salt foods before eating.  Do not eat more than 4 egg yolks a week.  Try to eat at least 2 vegetarian meals a week.  Eat more home-cooked food and less restaurant, buffet, and fast food.  Lifestyle  When eating at a restaurant, ask that your food be prepared with less salt or no salt, if possible.  If you drink alcohol:  Limit how much you use to:  0-1 drink a day for women who are not pregnant.  0-2 drinks a day for men.  Be aware of how much alcohol is in your drink. In the [place] one drink equals one 12 oz bottle of beer (355 mL), one 5 oz glass of wine (148 mL), or one 1½ oz glass of hard liquor (44 mL).  General information  Avoid eating more than 2,300 mg of salt a day. If you have hypertension, you may need to reduce your sodium intake to 1,500 mg a day.  Work with your health care provider to maintain a healthy body weight or to lose weight. Ask what an ideal weight is for you.  Get at least 30 minutes of exercise that causes your heart to beat faster (aerobic exercise) most days of the week. Activities may include walking, swimming, or biking.  Work with your health care provider or dietitian to adjust your eating plan to your individual calorie needs.  What foods should I eat?  Fruits  All fresh, dried, or frozen fruit. Canned fruit in natural juice (without added sugar).  Vegetables  Fresh or frozen vegetables (raw, steamed, roasted, or grilled). Low-sodium or reduced-sodium tomato and vegetable juice. Low-sodium or reduced-sodium tomato sauce and tomato paste. Low-sodium or reduced-sodium canned vegetables.  Grains  Whole-grain or whole-wheat bread. Whole-grain or whole-wheat pasta. Brown rice. Oatmeal. Quinoa.  Bulgur. Whole-grain and low-sodium cereals. Deena bread. Low-fat, low-sodium crackers. Whole-wheat flour tortillas.  Meats and other proteins  Skinless chicken or turkey. Ground chicken or turkey. Pork with fat trimmed off. Fish and seafood. Egg whites. Dried beans, peas, or lentils. Unsalted nuts, nut butters, and seeds. Unsalted canned beans. Lean cuts of beef with fat trimmed off. Low-sodium, lean precooked or cured meat, such as sausages or meat loaves.  Dairy  Low-fat (1%) or fat-free (skim) milk. Reduced-fat, low-fat, or fat-free cheeses. Nonfat, low-sodium ricotta or cottage cheese. Low-fat or nonfat yogurt. Low-fat, low-sodium cheese.  Fats and oils  Soft margarine without trans fats. Vegetable oil. Reduced-fat, low-fat, or light mayonnaise and salad dressings (reduced-sodium). Canola, safflower, olive, avocado, soybean, and sunflower oils. Avocado.  Seasonings and condiments  Herbs. Spices. Seasoning mixes without salt.  Other foods  Unsalted popcorn and pretzels. Fat-free sweets.  The items listed above may not be a complete list of foods and beverages you can eat. Contact a dietitian for more information.  What foods should I avoid?  Fruits  Canned fruit in a light or heavy syrup. Fried fruit. Fruit in cream or butter sauce.  Vegetables  Creamed or fried vegetables. Vegetables in a cheese sauce. Regular canned vegetables (not low-sodium or reduced-sodium). Regular canned tomato sauce and paste (not low-sodium or reduced-sodium). Regular tomato and vegetable juice (not low-sodium or reduced-sodium). Pickles. Olives.  Grains  Baked goods made with fat, such as croissants, muffins, or some breads. Dry pasta or rice meal packs.  Meats and other proteins  Fatty cuts of meat. Ribs. Fried meat. Nagel. Bologna, salami, and other precooked or cured meats, such as sausages or meat loaves. Fat from the back of a pig (fatback). Bratwurst. Salted nuts and seeds. Canned beans with added salt. Canned or smoked fish.  Whole eggs or egg yolks. Chicken or turkey with skin.  Dairy  Whole or 2% milk, cream, and half-and-half. Whole or full-fat cream cheese. Whole-fat or sweetened yogurt. Full-fat cheese. Nondairy creamers. Whipped toppings. Processed cheese and cheese spreads.  Fats and oils  Butter. Stick margarine. Lard. Shortening. Ghee. Nagel fat. Tropical oils, such as coconut, palm kernel, or palm oil.  Seasonings and condiments  Onion salt, garlic salt, seasoned salt, table salt, and sea salt. Worcestershire sauce. Tartar sauce. Barbecue sauce. Teriyaki sauce. Soy sauce, including reduced-sodium. Steak sauce. Canned and packaged gravies. Fish sauce. Oyster sauce. Cocktail sauce. Store-bought horseradish. Ketchup. Mustard. Meat flavorings and tenderizers. Bouillon cubes. Hot sauces. Pre-made or packaged marinades. Pre-made or packaged taco seasonings. Relishes. Regular salad dressings.  Other foods  Salted popcorn and pretzels.  The items listed above may not be a complete list of foods and beverages you should avoid. Contact a dietitian for more information.  Where to find more information  National Heart, Lung, and Blood [place]: www.nhlbi.nih.gov  American Heart Association: www.heart.org  Academy of Nutrition and Dietetics: www.eatright.org  National Kidney Foundation: www.kidney.org  Summary  The DASH eating plan is a healthy eating plan that has been shown to reduce high blood pressure (hypertension). It may also reduce your risk for type 2 diabetes, heart disease, and stroke.  When on the DASH eating plan, aim to eat more fresh fruits and vegetables, whole grains, lean proteins, low-fat dairy, and heart-healthy fats.  With the DASH eating plan, you should limit salt (sodium) intake to 2,300 mg a day. If you have hypertension, you may need to reduce your sodium intake to 1,500 mg a day.  Work with your health care provider or dietitian to adjust your eating plan to your individual calorie needs.  This information is not  intended to replace advice given to you by your health care provider. Make sure you discuss any questions you have with your health care provider.  Document Revised: 11/20/2020 Document Reviewed: 11/20/2020  Elsevier Patient Education © 2022 [employer]

## 2023-07-26 NOTE — PROGRESS NOTES
"    Follow Up Office Visit      Date: 2023   Patient Name: Greg Kee  : 1970   MRN: [id number]     Chief Complaint:    Chief Complaint   Patient presents with    Hypertension     fu       History of Present Illness: Greg Kee is a 53 y.o. male who presents to the clinic today for a follow-up visit to discuss hypertension.    Primary hypertension  Mr. Kee does not feel that his olmesartan has been quite effective. The patient was previously prescribed telmisartan, but his insurance plan did not cover the medication. He has been taking his hypertension medication with his morning meal each day. He does take rosuvastatin for cholesterol management.     Class 3 severe obesity due to excess calories with serious comorbidity and BMI of 40.0 to 44.9 in adult  The patient is quite pleased that he currently weighs 137 kg and he previously weighed 145kg. He reports that according to his scale, he has lost approximately 28 pounds. He does consume a keto diet.    Spinal stenosis of lumbar region with neurogenic claudication; Degenerative disc disease at L5-S1 level  The patient did recently receive an injection for his spinal pain and shoulder pain. He has noticed improvement with his shoulder pain, but he states he is \"dying\" from his back pain. He has been experiencing sleep disruption and states he typically wakes up 3 to 4 times during the night to change his positioning. Dr. Nieves had advised him that if the injections did not help, he would order another MRI. He did miss an appointment with Dr. Nieves on 2023 because his father was in the emergency department. He rescheduled this visit and has an appointment on 2023. The patient has been taking Extra Strength Tylenol and 5 tablets of ibuprofen together to try to manage his pain, but this does not help. He is no longer taking Mobic. He has inquired about obtaining a prescription medication to hold him over until " "he is able to see Dr. Nieves. He states he has found himself in a \"bad position\" as he still has to work, but no one wants to hire him.    Intertrigo  The patient is experiencing a rash in his groin area and on the bottom of his testicle. He was previously prescribed clobetasol and has noticed improvement while using this. He is familiar with Desitin cream.    Encounter for circumcision  Mr. Kee reports that when he urinates, his urine \"goes everywhere because the skin is so long on the end\" of his penis. He reports that his foreskin has been reaching over for quite some time, but he did not previously experience any issues. The patient has not previously undergone a circumcision.     Encounter for vaccination  The patient does not wish to receive a COVID-19 vaccination or shingles vaccination.    Subjective      Review of Systems:   Review of Systems   Constitutional:  Negative for activity change, appetite change, fatigue and fever.   Eyes:  Negative for blurred vision, photophobia and visual disturbance.   Respiratory:  Negative for cough, chest tightness and shortness of breath.    Cardiovascular:  Negative for chest pain and palpitations.   Gastrointestinal:  Negative for abdominal distention, abdominal pain, blood in stool, constipation, diarrhea, nausea and vomiting.   Genitourinary:  Negative for dysuria and hematuria.   Musculoskeletal:  Positive for back pain. Negative for arthralgias and joint swelling.   Skin:  Positive for rash. Negative for wound.   Neurological:  Negative for weakness, headache and confusion.     I have reviewed the patients family history, social history, past medical history, past surgical history and have updated it as appropriate.     Medications:     Current Outpatient Medications:     clotrimazole (LOTRIMIN) 1 % cream, Apply 1 application  topically to the appropriate area as directed 2 (Two) Times a Day., Disp: 28 g, Rfl: 3    olmesartan (BENICAR) 40 MG tablet, Take 1 " tablet by mouth Daily., Disp: 90 tablet, Rfl: 3    rosuvastatin (Crestor) 10 MG tablet, Take 1 tablet by mouth Daily., Disp: 90 tablet, Rfl: 3    NIFEdipine XL (PROCARDIA XL) 30 MG 24 hr tablet, Take 1 tablet by mouth Daily., Disp: 60 tablet, Rfl: 1    Allergies:   No Known Allergies    Objective     Physical Exam: Please see above  Vital Signs:   Vitals:    07/26/23 1143   BP: (!) 144/102   BP Location: Right arm   Patient Position: Sitting   Cuff Size: Adult   Resp: 16   Temp: 99.1 °F (37.3 °C)   TempSrc: Temporal   Weight: (!) 137 kg (301 lb 3.2 oz)   PainSc:   8   PainLoc: Back     Body mass index is 42.01 kg/m².  Class 3 Severe Obesity (BMI >=40). Obesity-related health conditions include the following: hypertension. Obesity is improving with lifestyle modifications. BMI is is above average; BMI management plan is completed. We discussed portion control and increasing exercise.       Physical Exam  Vitals and nursing note reviewed.   Constitutional:       General: He is not in acute distress.     Appearance: Normal appearance. He is obese. He is not ill-appearing or toxic-appearing.   HENT:      Nose: No congestion or rhinorrhea.   Eyes:      General:         Right eye: No discharge.         Left eye: No discharge.      Conjunctiva/sclera: Conjunctivae normal.   Pulmonary:      Effort: Pulmonary effort is normal. No respiratory distress.   Abdominal:      General: Abdomen is flat. There is no distension.   Musculoskeletal:      Cervical back: Normal range of motion.   Skin:     Coloration: Skin is not jaundiced.      Findings: No rash.   Neurological:      General: No focal deficit present.      Mental Status: He is alert. Mental status is at baseline.      Coordination: Coordination normal.      Gait: Gait normal.   Psychiatric:         Mood and Affect: Mood normal.         Behavior: Behavior normal.         Thought Content: Thought content normal.         Judgment: Judgment normal.        Procedures    Results:   Labs:   Hemoglobin A1C   Date Value Ref Range Status   03/13/2023 5.5 % Final     TSH   Date Value Ref Range Status   03/13/2023 1.680 0.270 - 4.200 uIU/mL Final        Imaging:   No valid procedures specified.     Assessment / Plan      Assessment/Plan:   Problem List Items Addressed This Visit       Primary hypertension - Primary    Current Assessment & Plan     - Patient advised to consume a diet that is low in sodium.  - Patient prescribed nifedipine XL 30 mg.          Relevant Medications    NIFEdipine XL (PROCARDIA XL) 30 MG 24 hr tablet    olmesartan (BENICAR) 40 MG tablet    Degenerative disc disease at L5-S1 level    Current Assessment & Plan     - Patient advised to discontinue Tylenol and ibuprofen and take Aleve instead.         Intertrigo    Current Assessment & Plan     - Clotrimazole 1% cream has been prescribed for the patient.  - Encouraged patient to apply Desitin cream.         Relevant Medications    clotrimazole (LOTRIMIN) 1 % cream    Class 3 severe obesity due to excess calories with serious comorbidity and body mass index (BMI) of 40.0 to 44.9 in adult    Spinal stenosis of lumbar region with neurogenic claudication    Current Assessment & Plan     - Patient advised to discontinue Tylenol and ibuprofen and take Aleve instead.         Encounter for circumcision    Current Assessment & Plan     - Referral to urology has been provided to patient.         Relevant Orders    Ambulatory Referral to Urology     Other Visit Diagnoses       Encounter for vaccination                  Follow Up:   Return in about 3 months (around 10/26/2023) for Recheck.          Scooby Ochoa MD  [facility] [place] [facility]    Transcribed from ambient dictation for Scooby Ochoa MD by Lilly Jacob.  07/26/23   15:33 EDT    Patient or patient representative verbalized consent to the visit recording.  I have personally performed the services described in this document as transcribed  by the above individual, and it is both accurate and complete.

## 2023-07-26 NOTE — ASSESSMENT & PLAN NOTE
- Patient advised to consume a diet that is low in sodium.  - Patient prescribed nifedipine XL 30 mg.

## 2023-07-26 NOTE — ASSESSMENT & PLAN NOTE
- Clotrimazole 1% cream has been prescribed for the patient.  - Encouraged patient to apply Desitin cream.

## 2023-08-17 ENCOUNTER — OFFICE VISIT (OUTPATIENT)
Dept: UROLOGY | Facility: CLINIC | Age: 53
End: 2023-08-17
Payer: MEDICAID

## 2023-08-17 VITALS
BODY MASS INDEX: 42 KG/M2 | HEART RATE: 69 BPM | SYSTOLIC BLOOD PRESSURE: 134 MMHG | WEIGHT: 300 LBS | OXYGEN SATURATION: 99 % | DIASTOLIC BLOOD PRESSURE: 76 MMHG | HEIGHT: 71 IN

## 2023-08-17 DIAGNOSIS — N48.1 BALANITIS: Primary | ICD-10-CM

## 2023-08-17 DIAGNOSIS — Z78.9 UNCIRCUMCISED MALE: ICD-10-CM

## 2023-08-17 RX ORDER — GABAPENTIN 300 MG/1
CAPSULE ORAL
COMMUNITY
Start: 2023-08-08

## 2023-08-17 NOTE — PROGRESS NOTES
Office Visit [facility] Urology      Patient Name: Greg Kee  : 1970   MRN: [id number]     Chief Complaint:    Chief Complaint   Patient presents with    Circumcision       Referring Provider: Scooby Ochoa MD    History of Present Illness: Greg Kee is a 53 y.o. male who presents to Urology today for evaluation of foreskin complaints.  The patient is uncircumcised.  He is originally from [facility], moved to [place] and had spent the last 25 years there.  He builds cabinets typically.  Now living in [place].  Reports difficulty with urinary stream given his uncircumcised status.  Reports a very long distal foreskin and difficulty pulling this back over the head of the penis.  He has had some intermittent inflammation related to moisture and wetness after he urinates causing some penile discomfort.    No urinary stream issues, PSA is normal.    Lab Results   Component Value Date    PSA 0.954 2023         Subjective      Review of System: Review of Systems   Genitourinary:  Positive for enuresis and penile pain.    I have reviewed the ROS documented by my clinical staff, I have updated appropriately and I agree. Primo Barba MD    Past Medical History: History reviewed. No pertinent past medical history.    Past Surgical History: History reviewed. No pertinent surgical history.    Family History:   Family History   Problem Relation Age of Onset    Prostate cancer Father        Social History:   Social History     Socioeconomic History    Marital status:    Tobacco Use    Smoking status: Never     Passive exposure: Never    Smokeless tobacco: Never   Vaping Use    Vaping Use: Never used   Substance and Sexual Activity    Alcohol use: Never    Drug use: Never    Sexual activity: Yes       Medications:     Current Outpatient Medications:     clotrimazole (LOTRIMIN) 1 % cream, Apply 1 application  topically to the appropriate area as directed 2 (Two) Times a Day.,  "Disp: 28 g, Rfl: 3    gabapentin (NEURONTIN) 300 MG capsule, , Disp: , Rfl:     NIFEdipine XL (PROCARDIA XL) 30 MG 24 hr tablet, Take 1 tablet by mouth Daily., Disp: 60 tablet, Rfl: 1    olmesartan (BENICAR) 40 MG tablet, Take 1 tablet by mouth Daily., Disp: 90 tablet, Rfl: 3    rosuvastatin (Crestor) 10 MG tablet, Take 1 tablet by mouth Daily., Disp: 90 tablet, Rfl: 3    Allergies:   No Known Allergies    Objective     Physical Exam:   Vital Signs:   Vitals:    08/17/23 1428   BP: 134/76   Pulse: 69   SpO2: 99%   Weight: 136 kg (300 lb)   Height: 180.3 cm (71\")     Body mass index is 41.84 kg/mý.     Physical Exam  Vitals and nursing note reviewed.   Constitutional:       Appearance: Normal appearance.   HENT:      Head: Normocephalic and atraumatic.      Mouth/Throat:      Mouth: Mucous membranes are moist.      Pharynx: Oropharynx is clear.   Eyes:      Extraocular Movements: Extraocular movements intact.      Conjunctiva/sclera: Conjunctivae normal.   Cardiovascular:      Rate and Rhythm: Normal rate and regular rhythm.   Pulmonary:      Effort: Pulmonary effort is normal. No respiratory distress.   Abdominal:      Palpations: Abdomen is soft.      Tenderness: There is no abdominal tenderness. There is no right CVA tenderness or left CVA tenderness.   Genitourinary:     Comments: uncircumcised phallus, orthotopic meatus, bilaterally descended testicles without masses, or lesions.  Mild whitish changes along the corona glans consistent with chronic inflammatory balanitis.      Musculoskeletal:         General: Normal range of motion.      Cervical back: Normal range of motion.   Skin:     General: Skin is warm and dry.   Neurological:      General: No focal deficit present.      Mental Status: He is alert and oriented to person, place, and time.   Psychiatric:         Mood and Affect: Mood normal.         Behavior: Behavior normal.       Labs:   Brief Urine Lab Results       None                 Lab Results "   Component Value Date    GLUCOSE 93 03/13/2023    CALCIUM 9.7 03/13/2023     03/13/2023    K 4.5 03/13/2023    CO2 23.7 03/13/2023     03/13/2023    BUN 9 03/13/2023    CREATININE 0.87 03/13/2023    BCR 10.3 03/13/2023    ANIONGAP 10.3 03/13/2023       No results found for: WBC, HGB, HCT, MCV, PLT    Images:   No Images in the past 120 days found..    Measures:   Tobacco:   Greg Kee  reports that he has never smoked. He has never been exposed to tobacco smoke. He has never used smokeless tobacco.    Assessment / Plan      Assessment/Plan:   Greg Kee is a 53 y.o. male who presented today for evaluation of foreskin complaints, likely recurrent balanitis based on physical exam findings.  He has a very long distal foreskin and he would like a formal circumcision.  Options discussed include dorsal slit, circumcision, steroid ointment or continued monitoring.  He has no evidence of phimosis and he simply like to move forward with a circumcision.  He states he has had discussions with his previous physicians in [place] regarding his issues at length and they have all recommended circumcision.  Risks of this procedure include numbness, sensation changes, sensitivity, wound healing issues, infection, bleeding, penile swelling that may last for a few weeks, lymphatic changes, lymphedema etc.  He has no pulmonary or cardiac history but we did discuss the risk of anesthesia.    Diagnoses and all orders for this visit:    1. Balanitis (Primary)  -     External Facility Surgical/Procedural Request; Future    2. Uncircumcised male  -     External Facility Surgical/Procedural Request; Future              Follow Up:   Return for Circumcision 9/13/23 surgery center .    I spent approximately 45 minutes providing clinical care for this patient; including review of patient's chart and provider documentation, face to face time spent with patient in examination room (obtaining history, performing  physical exam, discussing diagnosis and management options), placing orders, and completing patient documentation.     Primo Barba MD  [facility] [place]

## 2023-09-13 ENCOUNTER — OUTSIDE FACILITY SERVICE (OUTPATIENT)
Dept: UROLOGY | Facility: CLINIC | Age: 53
End: 2023-09-13
Payer: MEDICAID

## 2023-09-19 ENCOUNTER — TELEPHONE (OUTPATIENT)
Dept: UROLOGY | Facility: CLINIC | Age: 53
End: 2023-09-19

## 2023-09-19 ENCOUNTER — OUTSIDE FACILITY SERVICE (OUTPATIENT)
Dept: UROLOGY | Facility: CLINIC | Age: 53
End: 2023-09-19
Payer: MEDICAID

## 2023-09-19 ENCOUNTER — DOCUMENTATION (OUTPATIENT)
Dept: UROLOGY | Facility: CLINIC | Age: 53
End: 2023-09-19

## 2023-09-19 DIAGNOSIS — Z48.816 AFTERCARE FOR CIRCUMCISION: Primary | ICD-10-CM

## 2023-09-19 RX ORDER — HYDROCODONE BITARTRATE AND ACETAMINOPHEN 5; 325 MG/1; MG/1
1 TABLET ORAL EVERY 6 HOURS PRN
Qty: 12 TABLET | Refills: 0 | Status: SHIPPED | OUTPATIENT
Start: 2023-09-19

## 2023-09-19 NOTE — PROGRESS NOTES
[facility]   [address] [place] [id number]      OPERATIVE REPORT - Circumcision    Patient Name:  Greg Kee  YOB: 1970    Patient MRN: [id number]    Date of Surgery: 9/19/23      Indications:  52 yo M who desires circumcision for bothersome recurrent balanitis. Informed consent reviewed and signed. Risks discussed.       Pre-op Diagnosis:   Encounter for Circumcision  Balanitis    Post-op Diagnosis:   Encounter for Sterilization   Balanitis     Procedure Performed: Circumcision       Staff:  Primo Barba MD    Anesthesia: General    Estimated Blood Loss: Minimal    Implants:  None    Specimen: Foreskin    Drains: None      Findings: Uncomplicated circumcision    Complications: None    Description of Procedure:    The patient was identified and informed consent was reviewed and signed.  He was placed in the supine position.  His genitals were prepped and draped in sterile fashion.      The foreskin was marked at the mucosal collar circumferentially and at the distal foreskin along the shaft. A 15 blade scalped was used to make an incision at each circumferential brien on the foreskin.  I then continued the incision through the dartos with the Bovie cautery with hemostasis obtained.  I then used 2 Louise clamps to clamp the incised foreskin and then cut off the remnant redundant foreskin with the Bovie cautery and sent this off for pathology, the Bovie was used for cautery and spot hemostasis.  I then used multiple interrupted 4-0 Vicryl sutures to suture the dartos back to the mucosal collar dartos tissue with good apposition and hemostasis.  I then used a running 4-0 Monocryl suture subcuticular starting at the 12 o'clock position to the 6 o'clock position on the left, and a running locking fashion as well as a 12:00 to 6:00 running suture on the right side.  Once this was completed, I placed skin glue at the circumcision incision.  A Coban dressing and  4 x 4 gauze was then placed.  Mesh underwear was applied.       The patient tolerated the procedure well and was transported to the post operative area for recovery which was uncomplicated.     Disposition/Follow Up: Follow-up in 2 to 3 weeks for postop visit with nurse practitioner.    Primo Barba MD     Date: 9/19/2023  Time: 10:35 EDT

## 2023-09-19 NOTE — TELEPHONE ENCOUNTER
S/p circumcision at surgery center. F/u with Henna Patel for post op in 2-3 weeks, I'll be on paternity leave thanks.     Primo Barba MD

## 2023-09-20 ENCOUNTER — TELEPHONE (OUTPATIENT)
Dept: UROLOGY | Facility: CLINIC | Age: 53
End: 2023-09-20
Payer: MEDICAID

## 2023-09-20 NOTE — TELEPHONE ENCOUNTER
Informed pt to take dressing off and shower as normal, just don't scrub incision per Dr. Barba.  Patient voiced understanding.

## 2023-09-20 NOTE — TELEPHONE ENCOUNTER
"UNABLE TO WARM TRANSFER TO OFFICE    Caller: PT    Relationship to Patient: SELF    Phone Number: [phone]     Reason for Call: RECEIVED A CALL FROM  PT. HE CALLED ASKING ABOUT THE \"BLUE TAPE\" HE WANTS TO TAKE A SHOWER AND WANTED TO KNOW IF IT WOULD BE OK TO GET THE BLUE TAPE WET BECAUSE THAT IS WHERE THE INCISION IS AND HE WAS TOLD THE INCISION CAN NOT GET WET.            "

## 2023-10-09 ENCOUNTER — OFFICE VISIT (OUTPATIENT)
Dept: UROLOGY | Facility: CLINIC | Age: 53
End: 2023-10-09
Payer: MEDICAID

## 2023-10-09 VITALS — OXYGEN SATURATION: 97 % | WEIGHT: 300 LBS | HEART RATE: 73 BPM | BODY MASS INDEX: 42 KG/M2 | HEIGHT: 71 IN

## 2023-10-09 DIAGNOSIS — Z48.816 AFTERCARE FOR CIRCUMCISION: Primary | ICD-10-CM

## 2023-10-09 DIAGNOSIS — N48.1 BALANITIS: ICD-10-CM

## 2023-10-09 PROCEDURE — 99024 POSTOP FOLLOW-UP VISIT: CPT | Performed by: NURSE PRACTITIONER

## 2023-10-09 PROCEDURE — 1159F MED LIST DOCD IN RCRD: CPT | Performed by: NURSE PRACTITIONER

## 2023-10-09 PROCEDURE — 1160F RVW MEDS BY RX/DR IN RCRD: CPT | Performed by: NURSE PRACTITIONER

## 2023-10-09 NOTE — PROGRESS NOTES
"     Follow Up Office Visit      Patient Name: Greg Kee  : 1970   MRN: [id number]     Chief Complaint:    Chief Complaint   Patient presents with    Post-op     Balanitis        Referring Provider: No ref. provider found    History of Present Illness: Greg Kee is a 53 y.o. male who presents today for post operative follow up. He underwent Circumcision with Dr. Barba on 23 for history of recurrent Balanitis. He has healed well. He denies significant post-operative pain. He denies dysuria. He is voiding without difficulty.     Subjective      Review of System: Review of Systems   Genitourinary:  Negative for dysuria and hematuria.        S/p circumcision      I have reviewed the ROS documented by my clinical staff, I have updated appropriately and I agree. EDUARDA Thomas    I have reviewed and the following portions of the patient's history were updated as appropriate: past family history, past medical history, past social history, past surgical history and problem list.    Medications:     Current Outpatient Medications:     clotrimazole (LOTRIMIN) 1 % cream, Apply 1 application  topically to the appropriate area as directed 2 (Two) Times a Day., Disp: 28 g, Rfl: 3    gabapentin (NEURONTIN) 300 MG capsule, , Disp: , Rfl:     HYDROcodone-acetaminophen (Norco) 5-325 MG per tablet, Take 1 tablet by mouth Every 6 (Six) Hours As Needed for Severe Pain., Disp: 12 tablet, Rfl: 0    NIFEdipine XL (PROCARDIA XL) 30 MG 24 hr tablet, Take 1 tablet by mouth Daily., Disp: 60 tablet, Rfl: 1    olmesartan (BENICAR) 40 MG tablet, Take 1 tablet by mouth Daily., Disp: 90 tablet, Rfl: 3    rosuvastatin (Crestor) 10 MG tablet, Take 1 tablet by mouth Daily., Disp: 90 tablet, Rfl: 3    Allergies:   No Known Allergies    Objective     Physical Exam:   Vital Signs:   Vitals:    10/09/23 1358   Pulse: 73   SpO2: 97%   Weight: 136 kg (300 lb)   Height: 180.3 cm (71\")   PainSc:   2   PainLoc: " "Incisional     Body mass index is 41.84 kg/mý.     Physical Exam  Vitals and nursing note reviewed.   Constitutional:       Appearance: Normal appearance.   HENT:      Head: Normocephalic and atraumatic.      Nose: Nose normal.      Mouth/Throat:      Mouth: Mucous membranes are moist.   Eyes:      Pupils: Pupils are equal, round, and reactive to light.   Pulmonary:      Effort: Pulmonary effort is normal.   Abdominal:      General: Abdomen is flat.      Palpations: Abdomen is soft.   Genitourinary:     Comments: S/p Circumcision; Incision site well healed with scant erythema. No evidence of infection or drainage. Normal glans penis. Nontender to palpation of incision site.   Musculoskeletal:         General: Normal range of motion.      Cervical back: Normal range of motion.   Skin:     General: Skin is warm and dry.      Capillary Refill: Capillary refill takes less than 2 seconds.   Neurological:      General: No focal deficit present.      Mental Status: He is alert.   Psychiatric:         Mood and Affect: Mood normal.       Labs:   Brief Urine Lab Results       None                 Lab Results   Component Value Date    GLUCOSE 93 03/13/2023    CALCIUM 9.7 03/13/2023     03/13/2023    K 4.5 03/13/2023    CO2 23.7 03/13/2023     03/13/2023    BUN 9 03/13/2023    CREATININE 0.87 03/13/2023    BCR 10.3 03/13/2023    ANIONGAP 10.3 03/13/2023       No results found for: \"WBC\", \"HGB\", \"HCT\", \"MCV\", \"PLT\"    Images:   No Images in the past 120 days found..    Measures:   Tobacco:   Greg Kee  reports that he has never smoked. He has never been exposed to tobacco smoke. He has never used smokeless tobacco..        Urine Incontinence: Patient reports that he is not currently experiencing any symptoms of urinary incontinence.     Assessment / Plan      Assessment/Plan:   53 y.o. male who presented today for post-operative follow up s/p Circumcision with Dr. Barba on 09/19/23. He is healing " well. He denies significant pain/discomfort s/p circumcision. Incision site is well healed without signs of infection. We discussed to continue washing incision site with soap and water daily.     He would like to follow up as needed.     Diagnoses and all orders for this visit:    1. Aftercare for circumcision (Primary)    2. Balanitis       Follow Up:   Return if symptoms worsen or fail to improve.    I spent approximately 15 minutes providing clinical care for this patient; including review of patient's chart and provider documentation, face to face time spent with patient in examination room (obtaining history, performing physical exam, discussing diagnosis and management options), placing orders, and completing patient documentation.     EDUARDA Thomas  [facility] [place]

## 2023-11-27 DIAGNOSIS — I10 PRIMARY HYPERTENSION: ICD-10-CM

## 2023-11-27 RX ORDER — NIFEDIPINE 30 MG/1
30 TABLET, EXTENDED RELEASE ORAL DAILY
Qty: 60 TABLET | Refills: 1 | Status: SHIPPED | OUTPATIENT
Start: 2023-11-27

## 2023-11-29 ENCOUNTER — OFFICE VISIT (OUTPATIENT)
Dept: INTERNAL MEDICINE | Facility: CLINIC | Age: 53
End: 2023-11-29
Payer: MEDICAID

## 2023-11-29 VITALS
WEIGHT: 315 LBS | DIASTOLIC BLOOD PRESSURE: 88 MMHG | HEART RATE: 74 BPM | TEMPERATURE: 98 F | BODY MASS INDEX: 44.71 KG/M2 | SYSTOLIC BLOOD PRESSURE: 130 MMHG | RESPIRATION RATE: 16 BRPM

## 2023-11-29 DIAGNOSIS — I10 PRIMARY HYPERTENSION: Primary | ICD-10-CM

## 2023-11-29 DIAGNOSIS — M51.37 DEGENERATIVE DISC DISEASE AT L5-S1 LEVEL: ICD-10-CM

## 2023-11-29 DIAGNOSIS — M48.062 SPINAL STENOSIS OF LUMBAR REGION WITH NEUROGENIC CLAUDICATION: ICD-10-CM

## 2023-11-29 DIAGNOSIS — Z59.86 FINANCIAL INSECURITY: ICD-10-CM

## 2023-11-29 DIAGNOSIS — Z41.2 ENCOUNTER FOR CIRCUMCISION: ICD-10-CM

## 2023-11-29 PROBLEM — M19.019 DEGENERATIVE JOINT DISEASE OF SHOULDER REGION: Status: ACTIVE | Noted: 2023-08-08

## 2023-11-29 PROCEDURE — 3079F DIAST BP 80-89 MM HG: CPT | Performed by: STUDENT IN AN ORGANIZED HEALTH CARE EDUCATION/TRAINING PROGRAM

## 2023-11-29 PROCEDURE — 99214 OFFICE O/P EST MOD 30 MIN: CPT | Performed by: STUDENT IN AN ORGANIZED HEALTH CARE EDUCATION/TRAINING PROGRAM

## 2023-11-29 PROCEDURE — 3075F SYST BP GE 130 - 139MM HG: CPT | Performed by: STUDENT IN AN ORGANIZED HEALTH CARE EDUCATION/TRAINING PROGRAM

## 2023-11-29 RX ORDER — NIFEDIPINE 60 MG/1
60 TABLET, EXTENDED RELEASE ORAL DAILY
Qty: 30 TABLET | Refills: 1 | Status: CANCELLED | OUTPATIENT
Start: 2023-11-29

## 2023-11-29 SDOH — ECONOMIC STABILITY - INCOME SECURITY: FINANCIAL INSECURITY: Z59.86

## 2023-11-29 NOTE — PROGRESS NOTES
Follow Up Office Visit      Date: 2023   Patient Name: Greg Kee  : 1970   MRN: [id number]     Chief Complaint   Patient presents with    Back Pain     fu    Hypertension     fu       History of Present Illness: Greg Kee is a 53 y.o. male who is here today to follow up with back pain and hypertension.    Primary hypertension - Primary  Patient states he is getting headaches at home and his blood pressure is anywhere between 130-145. Patient states he does not want to talk about dash diet again today. He reports taking his medications as prescribed.     Degenerative disc disease at L5-S1 level  Patient reports he is receiving injections for his back pain and received one today, he states it was his second shot. He reports that he first shot did not seem to help and if that does not help then they would possibly consider surgical intervention. Patient states he is unsure if he would like to have surgery for this issue. He reports taking 500 mg tylenol, 400 mg ibuprofen, and his prescribed gabapentin for pain. He states he is not taking the Norco and that the medications he is taking help a little. 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         Encounter for circumcision  Patient reports he had his circumcision procedure and that it healed up very fast and hygiene in that area has improved.         Subjective      Review of Systems:   Review of Systems   Constitutional:  Positive for activity change. Negative for fatigue.   Eyes:  Positive for visual disturbance.   Musculoskeletal:  Positive for back pain.   Neurological:  Positive for headache.       I have reviewed the patients family history, social history, past medical history, past surgical history and have updated it as appropriate.     Medications:     Current Outpatient Medications:      clotrimazole (LOTRIMIN) 1 % cream, Apply 1 application  topically to the appropriate area as directed 2 (Two) Times a Day., Disp: 28 g, Rfl: 3    gabapentin (NEURONTIN) 300 MG capsule, Take 1 capsule by mouth 4 (Four) Times a Day., Disp: , Rfl:     NIFEdipine XL (PROCARDIA XL) 30 MG 24 hr tablet, TAKE ONE (1) TABLET BY MOUTH DAILY, Disp: 60 tablet, Rfl: 1    olmesartan (BENICAR) 40 MG tablet, Take 1 tablet by mouth Daily., Disp: 90 tablet, Rfl: 3    rosuvastatin (Crestor) 10 MG tablet, Take 1 tablet by mouth Daily., Disp: 90 tablet, Rfl: 3    Allergies:   No Known Allergies    Objective       Vital Signs:   Vitals:    11/29/23 1136   BP: 130/88   Pulse: 74   Resp: 16   Temp: 98 °F (36.7 °C)              Physical Exam      Results:   Labs:   Hemoglobin A1C   Date Value Ref Range Status   03/13/2023 5.5 % Final     TSH   Date Value Ref Range Status   03/13/2023 1.680 0.270 - 4.200 uIU/mL Final        Imaging:   No valid procedures specified.     Assessment / Plan      Assessment/Plan:   Problem List Items Addressed This Visit       Primary hypertension - Primary    Degenerative disc disease at L5-S1 level    Spinal stenosis of lumbar region with neurogenic claudication    Encounter for circumcision    Financial insecurity          Primary hypertension - Primary  Discussed patients at home blood pressure readings, headaches, and vision disturbances. He was educated on importance of decreasing sodium in his diet and is not interested in changing his diet at this time. We also discussed increasing his nifedipine to 60 mg and he does not want to change his medications at this time. Patient was educated on current guidelines for his blood pressure and his goal of being under 140/90. He did state that he will continue to track his blood pressure at home and if values are 145 or higher he would be agreeable to a medication adjustment.     Degenerative disc disease at L5-S1 level  Discussed current treatments the patient is  receiving with interventional pain. Patient will continue this treatment and follow up with our clinic as needed for increase or unchanged pain so we can help him navigate his care and pursue different treatment options.      Encounter for circumcision  Discussed recovery and patient is healing well and has no needs or concerns.      Financial insecurity   Patient is concerned about his finances with his injury and how it inhibits him from performing his daily job.   We suggested having our  reach out to him and he is not interested at this time but we will continue to follow up on this at future appointments if problem persists.    Follow Up:   Return in about 4 weeks (around 12/27/2023) for Recheck.     Lori Vasquez, [employer] Student  11/29/2023

## 2023-11-29 NOTE — PROGRESS NOTES
Follow Up Office Visit      Date: 2023   Patient Name: Greg Kee  : 1970   MRN: [id number]     Chief Complaint:    Chief Complaint   Patient presents with    Hyperlipidemia     fu       History of Present Illness: Greg Kee is a 53 y.o. male who is here today to follow up with ***.    ***DAXHPI      Subjective      Review of Systems:   Review of Systems    I have reviewed the patients family history, social history, past medical history, past surgical history and have updated it as appropriate.     Medications:     Current Outpatient Medications:     clotrimazole (LOTRIMIN) 1 % cream, Apply 1 application  topically to the appropriate area as directed 2 (Two) Times a Day., Disp: 28 g, Rfl: 3    gabapentin (NEURONTIN) 300 MG capsule, Take 1 capsule by mouth 4 (Four) Times a Day., Disp: , Rfl:     NIFEdipine XL (PROCARDIA XL) 30 MG 24 hr tablet, TAKE ONE (1) TABLET BY MOUTH DAILY, Disp: 60 tablet, Rfl: 1    olmesartan (BENICAR) 40 MG tablet, Take 1 tablet by mouth Daily., Disp: 90 tablet, Rfl: 3    rosuvastatin (Crestor) 10 MG tablet, Take 1 tablet by mouth Daily., Disp: 90 tablet, Rfl: 3    Allergies:   No Known Allergies    Objective     Physical Exam: Please see above  Vital Signs:   Vitals:    23 1136   BP: 130/88   BP Location: Right arm   Patient Position: Sitting   Cuff Size: Adult   Pulse: 74   Resp: 16   Temp: 98 °F (36.7 °C)   TempSrc: Temporal   Weight: (!) 145 kg (320 lb 9.6 oz)   PainSc: 10-Worst pain ever   PainLoc: Back     Body mass index is 44.71 kg/m².          Physical Exam    Procedures    Results:   Labs:   Hemoglobin A1C   Date Value Ref Range Status   2023 5.5 % Final     TSH   Date Value Ref Range Status   2023 1.680 0.270 - 4.200 uIU/mL Final        Imaging:   No valid procedures specified.     Assessment / Plan      Assessment/Plan:   Problem List Items Addressed This Visit       Primary hypertension - Primary    Degenerative disc  disease at L5-S1 level    Spinal stenosis of lumbar region with neurogenic claudication    Encounter for circumcision         Follow Up:   Return in about 4 weeks (around 12/27/2023) for Recheck.    {2021TIMESPENTOPTIONAL (Optional):56826}      Scooby Ochoa MD  [facility] [place] [facility]

## 2023-11-29 NOTE — PROGRESS NOTES
I have reviewed the notes, assessments, and/or procedures performed by Lori Vasquez, I concur with her/his documentation of Greg Kee.

## 2024-01-03 ENCOUNTER — OFFICE VISIT (OUTPATIENT)
Dept: INTERNAL MEDICINE | Facility: CLINIC | Age: 54
End: 2024-01-03
Payer: MEDICAID

## 2024-01-03 ENCOUNTER — HOSPITAL ENCOUNTER (OUTPATIENT)
Dept: GENERAL RADIOLOGY | Facility: HOSPITAL | Age: 54
Discharge: HOME | End: 2024-01-03
Admitting: STUDENT IN AN ORGANIZED HEALTH CARE EDUCATION/TRAINING PROGRAM
Payer: MEDICAID

## 2024-01-03 VITALS
SYSTOLIC BLOOD PRESSURE: 148 MMHG | RESPIRATION RATE: 18 BRPM | DIASTOLIC BLOOD PRESSURE: 110 MMHG | TEMPERATURE: 97.8 F | WEIGHT: 315 LBS | HEART RATE: 74 BPM | BODY MASS INDEX: 45.19 KG/M2

## 2024-01-03 DIAGNOSIS — M51.37 DEGENERATIVE DISC DISEASE AT L5-S1 LEVEL: ICD-10-CM

## 2024-01-03 DIAGNOSIS — M19.012 PRIMARY OSTEOARTHRITIS OF BOTH SHOULDERS: ICD-10-CM

## 2024-01-03 DIAGNOSIS — M12.811 ROTATOR CUFF ARTHROPATHY OF BOTH SHOULDERS: ICD-10-CM

## 2024-01-03 DIAGNOSIS — M19.011 PRIMARY OSTEOARTHRITIS OF BOTH SHOULDERS: ICD-10-CM

## 2024-01-03 DIAGNOSIS — I10 PRIMARY HYPERTENSION: ICD-10-CM

## 2024-01-03 DIAGNOSIS — M12.812 ROTATOR CUFF ARTHROPATHY OF BOTH SHOULDERS: ICD-10-CM

## 2024-01-03 DIAGNOSIS — R07.81 PLEURITIC CHEST PAIN: ICD-10-CM

## 2024-01-03 DIAGNOSIS — L21.9 SEBORRHEIC DERMATITIS: ICD-10-CM

## 2024-01-03 DIAGNOSIS — I10 PRIMARY HYPERTENSION: Primary | ICD-10-CM

## 2024-01-03 DIAGNOSIS — E66.01 CLASS 3 SEVERE OBESITY DUE TO EXCESS CALORIES WITH SERIOUS COMORBIDITY AND BODY MASS INDEX (BMI) OF 40.0 TO 44.9 IN ADULT: ICD-10-CM

## 2024-01-03 PROCEDURE — 71046 X-RAY EXAM CHEST 2 VIEWS: CPT

## 2024-01-03 PROCEDURE — 73030 X-RAY EXAM OF SHOULDER: CPT

## 2024-01-03 PROCEDURE — 99214 OFFICE O/P EST MOD 30 MIN: CPT | Performed by: STUDENT IN AN ORGANIZED HEALTH CARE EDUCATION/TRAINING PROGRAM

## 2024-01-03 PROCEDURE — 1159F MED LIST DOCD IN RCRD: CPT | Performed by: STUDENT IN AN ORGANIZED HEALTH CARE EDUCATION/TRAINING PROGRAM

## 2024-01-03 PROCEDURE — 3080F DIAST BP >= 90 MM HG: CPT | Performed by: STUDENT IN AN ORGANIZED HEALTH CARE EDUCATION/TRAINING PROGRAM

## 2024-01-03 PROCEDURE — 3077F SYST BP >= 140 MM HG: CPT | Performed by: STUDENT IN AN ORGANIZED HEALTH CARE EDUCATION/TRAINING PROGRAM

## 2024-01-03 PROCEDURE — 1160F RVW MEDS BY RX/DR IN RCRD: CPT | Performed by: STUDENT IN AN ORGANIZED HEALTH CARE EDUCATION/TRAINING PROGRAM

## 2024-01-03 RX ORDER — KETOCONAZOLE 20 MG/ML
SHAMPOO TOPICAL 2 TIMES WEEKLY
Qty: 120 ML | Refills: 1 | Status: SHIPPED | OUTPATIENT
Start: 2024-01-04

## 2024-01-03 RX ORDER — NIFEDIPINE 60 MG/1
60 TABLET, EXTENDED RELEASE ORAL DAILY
Qty: 30 TABLET | Refills: 1 | Status: SHIPPED | OUTPATIENT
Start: 2024-01-03

## 2024-01-03 NOTE — LETTER
January 5, 2024     Patient: Greg Kee   YOB: 1970   Date of Visit: 1/3/2024       To Whom It May Concern:    I am writing this letter on behalf of my patient, Mr. Greg Kee, who has been under my care for the past several years. Mr. Kee is suffering from osteoarthritis in both shoulders, which has greatly limited his ability to engage in regular work activities.    Osteoarthritis is a degenerative joint disease that results in the breakdown of joint cartilage and underlying bone. In Mr. Kee's case, the condition has progressed to a stage where it has significantly impaired his mobility and causes him chronic pain, particularly in tasks that involve lifting, pushing, pulling, or any other movement of the shoulder joints.    Despite undergoing several treatment modalities, such as physical therapy and medication, Mr. Kee's condition has not shown significant improvement. This has led to a significant reduction in his functional capacity, rendering him unable to perform tasks that he previously could. This includes but is not limited to, tasks requiring manual labor, repetitive movements, or prolonged periods of sitting or standing.    Given his current health status, it is my professional opinion that Mr. Kee cannot continue to work in his previous capacity. His osteoarthritis imposes severe restrictions on his ability to engage in substantial gainful activity. As such, he is applying for Social Security Disability benefits.    I trust you will find this information helpful in evaluating Mr. Kee's application for Social Security Disability benefits.    Thank you for your time and consideration. If you require any further information, please do not hesitate to contact me.         Sincerely,        Scooby Ochoa MD

## 2024-01-03 NOTE — PROGRESS NOTES
Follow Up Office Visit      Date: 2024   Patient Name: Greg Kee  : 1970   MRN: [id number]     Chief Complaint:    Chief Complaint   Patient presents with    Personal Problem    Hypertension     Fu  headache       History of Present Illness: Greg Kee is a 53 y.o. male who is here today to follow up with chronic care management.    Greg Kee is a 53-year-old male who presents for evaluation of multiple medical concerns.    Rotator cuff arthropathy of both shoulders  The patient reports he has severe bilateral shoulder pain, right worse than left. He describes the pain as very sharp. He wonders if he can get another injection. He wonders if arthritis can be fixed or not. He wonders if he will receive the injections for the rest of his life. [facility] been giving him injections in his back and shoulders. The first injection in his shoulder was helpful, but the second injection was not beneficial. The pain keeps him from sleeping. He does not know how to put his arm on top or on the side. He is taking 4 ibuprofen 200 mg and 2 Tylenol for his back and shoulder pain. He is still taking gabapentin for his back, which helps. He works 2 hours a day because of the pain symptoms. The patient states that it is painful in the mornings.    Seborrheic dermatitis  The patient reports he has small bumps present on his scalp. He notes that it is itchy especially when he wears his hat and starts to sweat. He has to scrub it and the flakes come out of it. He has some redness. He used Selsun blue, which used to help but not anymore. He wonders if gabapentin is a side effect.    Pleuritic chest pain  The patient reports he experiences a sharp pain localized in the left side of his chest at times. He wonders if it is due to his stomach. He notes that it is unusual.    Subjective      Review of Systems:   Review of Systems    I have reviewed the patients family  history, social history, past medical history, past surgical history and have updated it as appropriate.     Medications:     Current Outpatient Medications:     clotrimazole (LOTRIMIN) 1 % cream, Apply 1 application  topically to the appropriate area as directed 2 (Two) Times a Day., Disp: 28 g, Rfl: 3    gabapentin (NEURONTIN) 300 MG capsule, Take 1 capsule by mouth 4 (Four) Times a Day., Disp: , Rfl:     NIFEdipine XL (PROCARDIA XL) 60 MG 24 hr tablet, Take 1 tablet by mouth Daily., Disp: 30 tablet, Rfl: 1    olmesartan (BENICAR) 40 MG tablet, Take 1 tablet by mouth Daily., Disp: 90 tablet, Rfl: 3    rosuvastatin (Crestor) 10 MG tablet, Take 1 tablet by mouth Daily., Disp: 90 tablet, Rfl: 3    ketoconazole (NIZORAL) 2 % shampoo, Apply  topically to the appropriate area as directed 2 (Two) Times a Week., Disp: 120 mL, Rfl: 1    Allergies:   No Known Allergies    Objective     Physical Exam: Please see above  Vital Signs:   Vitals:    01/03/24 1134   BP: (!) 148/110   BP Location: Right arm   Patient Position: Sitting   Cuff Size: Adult   Pulse: 74   Resp: 18   Temp: 97.8 °F (36.6 °C)   TempSrc: Temporal   Weight: (!) 147 kg (324 lb)   PainSc: 0-No pain     Body mass index is 45.19 kg/m².          Physical Exam    Procedures    Results:   Labs:   Hemoglobin A1C   Date Value Ref Range Status   03/13/2023 5.5 % Final     TSH   Date Value Ref Range Status   03/13/2023 1.680 0.270 - 4.200 uIU/mL Final        Imaging:   No valid procedures specified.     Assessment / Plan      Assessment/Plan:   Problem List Items Addressed This Visit       Primary hypertension - Primary    Relevant Medications    NIFEdipine XL (PROCARDIA XL) 60 MG 24 hr tablet    Degenerative disc disease at L5-S1 level    Relevant Orders    Ambulatory Referral to Physical Therapy Evaluate and treat, Ortho    Rotator cuff arthropathy of both shoulders    Relevant Orders    XR Shoulder 2+ View Right    XR Shoulder 2+ View Left    Ambulatory Referral to  Physical Therapy Evaluate and treat, Ortho    Class 3 severe obesity due to excess calories with serious comorbidity and body mass index (BMI) of 40.0 to 44.9 in adult    Degenerative joint disease of shoulder region    Relevant Orders    XR Shoulder 2+ View Right    XR Shoulder 2+ View Left    Seborrheic dermatitis    Relevant Medications    ketoconazole (NIZORAL) 2 % shampoo    Pleuritic chest pain    Relevant Orders    XR Chest PA & Lateral (Completed)         1.Primary hypertension - Primary  - Nifedipine XL (PROCARDIA XL) 60 MG 24 hr tablet  - His blood pressure is elevated today at 148/110 mmHg  - Explained that by his blood pressure being elevated can be associated with the shoulder pain itself and cardiovascular changes as well  - Will increase his nifedipine to better control his blood pressure range  - Advised to continue taking Olmesartan     2. Degenerative disc disease at L5-S1 level  - Ambulatory Referral to Physical Therapy Evaluate and treat, Ortho     3. Rotator cuff arthropathy of both shoulders  - XR Shoulder 2+ View Right  - XR Shoulder 2+ View Left  - Ambulatory Referral to Physical Therapy Evaluate and treat, Ortho  - Explained that if the x-ray is normal, we will get an MRI to see if it is ligament related  - Advised that he can use over-the-counter Voltaren gel or lidocaine patches     4. Class 3 severe obesity due to excess calories with serious comorbidity and body mass index (BMI) of 40.0 to 44.9 in adult  - Patient was provided with information on a dash diet that will decrease sodium intake that will help with weight loss and blood pressure     5. Degenerative joint disease of shoulder region  - XR Shoulder 2+ View Right  - XR Shoulder 2+ View Left     6. Seborrheic dermatitis  - ketoconazole (NIZORAL) 2 % shampoo (Start on 1/4/2024)     7. Pleuritic chest pain  - XR Chest PA & Lateral  - Advised that if the pain becomes worse when he is exerting himself, he will let us know and we will  advance the cardiovascular testing  Follow Up:   Return in about 4 weeks (around 1/31/2024) for Recheck.          Scooby Ochoa MD  [facility]  Transcribed from ambient dictation for Scooby Ochoa MD by Lionel Hutson.  01/03/24   12:42 EST    Patient or patient representative verbalized consent to the visit recording.  I have personally performed the services described in this document as transcribed by the above individual, and it is both accurate and complete.

## 2024-01-03 NOTE — PATIENT INSTRUCTIONS
"DASH Eating Plan  DASH stands for Dietary Approaches to Stop Hypertension. The DASH eating plan is a healthy eating plan that has been shown to:  Reduce high blood pressure (hypertension).  Reduce your risk for type 2 diabetes, heart disease, and stroke.  Help with weight loss.  What are tips for following this plan?  Reading food labels  Check food labels for the amount of salt (sodium) per serving. Choose foods with less than 5 percent of the Daily Value of sodium. Generally, foods with less than 300 milligrams (mg) of sodium per serving fit into this eating plan.  To find whole grains, look for the word \"whole\" as the first word in the ingredient list.  Shopping  Buy products labeled as \"low-sodium\" or \"no salt added.\"  Buy fresh foods. Avoid canned foods and pre-made or frozen meals.  Cooking  Avoid adding salt when cooking. Use salt-free seasonings or herbs instead of table salt or sea salt. Check with your health care provider or pharmacist before using salt substitutes.  Do not [place] foods. Cook foods using healthy methods such as baking, boiling, grilling, roasting, and broiling instead.  Cook with heart-healthy oils, such as olive, canola, avocado, soybean, or sunflower oil.  Meal planning  Eat a balanced diet that includes:  4 or more servings of fruits and 4 or more servings of vegetables each day. Try to fill one-half of your plate with fruits and vegetables.  6-8 servings of whole grains each day.  Less than 6 oz (170 g) of lean meat, poultry, or fish each day. A 3-oz (85-g) serving of meat is about the same size as a deck of cards. One egg equals 1 oz (28 g).  2-3 servings of low-fat dairy each day. One serving is 1 cup (237 mL).  1 serving of nuts, seeds, or beans 5 times each week.  2-3 servings of heart-healthy fats. Healthy fats called omega-3 fatty acids are found in foods such as walnuts, flaxseeds, fortified milks, and eggs. These fats are also found in cold-water fish, such as sardines, salmon, " and mackerel.  Limit how much you eat of:  Canned or prepackaged foods.  Food that is high in trans fat, such as some fried foods.  Food that is high in saturated fat, such as fatty meat.  Desserts and other sweets, sugary drinks, and other foods with added sugar.  Full-fat dairy products.  Do not salt foods before eating.  Do not eat more than 4 egg yolks a week.  Try to eat at least 2 vegetarian meals a week.  Eat more home-cooked food and less restaurant, buffet, and fast food.  Lifestyle  When eating at a restaurant, ask that your food be prepared with less salt or no salt, if possible.  If you drink alcohol:  Limit how much you use to:  0-1 drink a day for women who are not pregnant.  0-2 drinks a day for men.  Be aware of how much alcohol is in your drink. In the [place] one drink equals one 12 oz bottle of beer (355 mL), one 5 oz glass of wine (148 mL), or one 1½ oz glass of hard liquor (44 mL).  General information  Avoid eating more than 2,300 mg of salt a day. If you have hypertension, you may need to reduce your sodium intake to 1,500 mg a day.  Work with your health care provider to maintain a healthy body weight or to lose weight. Ask what an ideal weight is for you.  Get at least 30 minutes of exercise that causes your heart to beat faster (aerobic exercise) most days of the week. Activities may include walking, swimming, or biking.  Work with your health care provider or dietitian to adjust your eating plan to your individual calorie needs.  What foods should I eat?  Fruits  All fresh, dried, or frozen fruit. Canned fruit in natural juice (without added sugar).  Vegetables  Fresh or frozen vegetables (raw, steamed, roasted, or grilled). Low-sodium or reduced-sodium tomato and vegetable juice. Low-sodium or reduced-sodium tomato sauce and tomato paste. Low-sodium or reduced-sodium canned vegetables.  Grains  Whole-grain or whole-wheat bread. Whole-grain or whole-wheat pasta. Brown rice. Oatmeal. Quinoa.  Bulgur. Whole-grain and low-sodium cereals. Deena bread. Low-fat, low-sodium crackers. Whole-wheat flour tortillas.  Meats and other proteins  Skinless chicken or turkey. Ground chicken or turkey. Pork with fat trimmed off. Fish and seafood. Egg whites. Dried beans, peas, or lentils. Unsalted nuts, nut butters, and seeds. Unsalted canned beans. Lean cuts of beef with fat trimmed off. Low-sodium, lean precooked or cured meat, such as sausages or meat loaves.  Dairy  Low-fat (1%) or fat-free (skim) milk. Reduced-fat, low-fat, or fat-free cheeses. Nonfat, low-sodium ricotta or cottage cheese. Low-fat or nonfat yogurt. Low-fat, low-sodium cheese.  Fats and oils  Soft margarine without trans fats. Vegetable oil. Reduced-fat, low-fat, or light mayonnaise and salad dressings (reduced-sodium). Canola, safflower, olive, avocado, soybean, and sunflower oils. Avocado.  Seasonings and condiments  Herbs. Spices. Seasoning mixes without salt.  Other foods  Unsalted popcorn and pretzels. Fat-free sweets.  The items listed above may not be a complete list of foods and beverages you can eat. Contact a dietitian for more information.  What foods should I avoid?  Fruits  Canned fruit in a light or heavy syrup. Fried fruit. Fruit in cream or butter sauce.  Vegetables  Creamed or fried vegetables. Vegetables in a cheese sauce. Regular canned vegetables (not low-sodium or reduced-sodium). Regular canned tomato sauce and paste (not low-sodium or reduced-sodium). Regular tomato and vegetable juice (not low-sodium or reduced-sodium). Pickles. Olives.  Grains  Baked goods made with fat, such as croissants, muffins, or some breads. Dry pasta or rice meal packs.  Meats and other proteins  Fatty cuts of meat. Ribs. Fried meat. Nagel. Bologna, salami, and other precooked or cured meats, such as sausages or meat loaves. Fat from the back of a pig (fatback). Bratwurst. Salted nuts and seeds. Canned beans with added salt. Canned or smoked fish.  Whole eggs or egg yolks. Chicken or turkey with skin.  Dairy  Whole or 2% milk, cream, and half-and-half. Whole or full-fat cream cheese. Whole-fat or sweetened yogurt. Full-fat cheese. Nondairy creamers. Whipped toppings. Processed cheese and cheese spreads.  Fats and oils  Butter. Stick margarine. Lard. Shortening. Ghee. Nagel fat. Tropical oils, such as coconut, palm kernel, or palm oil.  Seasonings and condiments  Onion salt, garlic salt, seasoned salt, table salt, and sea salt. Worcestershire sauce. Tartar sauce. Barbecue sauce. Teriyaki sauce. Soy sauce, including reduced-sodium. Steak sauce. Canned and packaged gravies. Fish sauce. Oyster sauce. Cocktail sauce. Store-bought horseradish. Ketchup. Mustard. Meat flavorings and tenderizers. Bouillon cubes. Hot sauces. Pre-made or packaged marinades. Pre-made or packaged taco seasonings. Relishes. Regular salad dressings.  Other foods  Salted popcorn and pretzels.  The items listed above may not be a complete list of foods and beverages you should avoid. Contact a dietitian for more information.  Where to find more information  National Heart, Lung, and Blood [place]: www.nhlbi.nih.gov  American Heart Association: www.heart.org  Academy of Nutrition and Dietetics: www.eatright.org  National Kidney Foundation: www.kidney.org  Summary  The DASH eating plan is a healthy eating plan that has been shown to reduce high blood pressure (hypertension). It may also reduce your risk for type 2 diabetes, heart disease, and stroke.  When on the DASH eating plan, aim to eat more fresh fruits and vegetables, whole grains, lean proteins, low-fat dairy, and heart-healthy fats.  With the DASH eating plan, you should limit salt (sodium) intake to 2,300 mg a day. If you have hypertension, you may need to reduce your sodium intake to 1,500 mg a day.  Work with your health care provider or dietitian to adjust your eating plan to your individual calorie needs.  This information is not  intended to replace advice given to you by your health care provider. Make sure you discuss any questions you have with your health care provider.  Document Revised: 11/20/2020 Document Reviewed: 11/20/2020  Elsevier Patient Education © 2022 [employer] Inc.

## 2024-01-04 ENCOUNTER — TELEPHONE (OUTPATIENT)
Dept: INTERNAL MEDICINE | Facility: CLINIC | Age: 54
End: 2024-01-04
Payer: MEDICAID

## 2024-01-04 NOTE — TELEPHONE ENCOUNTER
----- Message from Scooby Ochoa MD sent at 1/4/2024  1:55 PM EST -----  Does he need the letter to state anything other than he has osteoarthritis like that limits his ability to work or anything else?  Thanks.  ----- Message -----  From: Louisa Whitten  Sent: 1/4/2024   1:25 PM EST  To: Scooby Ochoa MD    Message relayed to Pt. Good understanding verbalized. Pt is asking if Dr. Ochoa provide a letter stating findings to add to his Social Security Administration benefit application.   Email [email or web address]

## 2024-01-05 ENCOUNTER — TELEPHONE (OUTPATIENT)
Dept: INTERNAL MEDICINE | Facility: CLINIC | Age: 54
End: 2024-01-05
Payer: MEDICAID

## 2024-01-05 NOTE — TELEPHONE ENCOUNTER
----- Message from Scooby Ochoa MD sent at 1/5/2024  1:51 PM EST -----  I've attached a letter to his last office visit.  Thanks.  ----- Message -----  From: Louisa Whitten  Sent: 1/4/2024   4:56 PM EST  To: Scooby Ochoa MD    Pt stated provider can state limited restriction as he works through treatment for osteoarthritis. Pt stated if they need more they will have to say what is needed.

## 2024-01-31 ENCOUNTER — TELEPHONE (OUTPATIENT)
Dept: INTERNAL MEDICINE | Facility: CLINIC | Age: 54
End: 2024-01-31
Payer: MEDICAID

## 2024-01-31 NOTE — TELEPHONE ENCOUNTER
Reached Pt and gave him number to radiology [phone] so he can call to request imaging. Pt verbalized good understanding.

## 2024-01-31 NOTE — TELEPHONE ENCOUNTER
Caller: Greg Kee    Relationship: Self    Best call back number: [phone]    Do you know the name of the person who called: GREG    What was the call regarding: PATIENT STATED THAT THE SPECIALIST WHO IS ADDRESSING HIS SHOULDER ISSUE(S) ON MONDAY 2/5/24 IS REQUESTING HE BRING A COPY OF THE RESULTS OF HIS MRI, X-RAYS, AND ANY OTHER RELEVANT RESULTS ON DISC.    [facility] WAS ADVISED THAT THE OFFICE IS NOT ABLE TO DO THIS AND WAS ADVISED TO CONTACT RADIOLOGY SPECIFICALLY.    [facility] ATTEMPTED TO CONFIRM AND WARM TRANSFER WITH RADIOLOGY, BUT THE PATIENT DISCONNECTED THE CALL.    PLEASE BE ADVISED

## 2024-02-28 ENCOUNTER — OFFICE VISIT (OUTPATIENT)
Dept: INTERNAL MEDICINE | Facility: CLINIC | Age: 54
End: 2024-02-28
Payer: MEDICAID

## 2024-02-28 VITALS
TEMPERATURE: 97.3 F | HEART RATE: 70 BPM | WEIGHT: 315 LBS | BODY MASS INDEX: 45.5 KG/M2 | SYSTOLIC BLOOD PRESSURE: 138 MMHG | DIASTOLIC BLOOD PRESSURE: 80 MMHG | RESPIRATION RATE: 20 BRPM

## 2024-02-28 DIAGNOSIS — E78.00 PURE HYPERCHOLESTEROLEMIA: ICD-10-CM

## 2024-02-28 DIAGNOSIS — J34.2 NOSE SEPTUM DEVIATION: ICD-10-CM

## 2024-02-28 DIAGNOSIS — E88.819 INSULIN RESISTANCE: ICD-10-CM

## 2024-02-28 DIAGNOSIS — I10 PRIMARY HYPERTENSION: ICD-10-CM

## 2024-02-28 DIAGNOSIS — M12.812 ROTATOR CUFF ARTHROPATHY OF BOTH SHOULDERS: ICD-10-CM

## 2024-02-28 DIAGNOSIS — M51.37 DEGENERATIVE DISC DISEASE AT L5-S1 LEVEL: ICD-10-CM

## 2024-02-28 DIAGNOSIS — M12.811 ROTATOR CUFF ARTHROPATHY OF BOTH SHOULDERS: ICD-10-CM

## 2024-02-28 DIAGNOSIS — E66.01 CLASS 3 SEVERE OBESITY DUE TO EXCESS CALORIES WITH SERIOUS COMORBIDITY AND BODY MASS INDEX (BMI) OF 40.0 TO 44.9 IN ADULT: ICD-10-CM

## 2024-02-28 DIAGNOSIS — L30.4 INTERTRIGO: ICD-10-CM

## 2024-02-28 DIAGNOSIS — M48.062 SPINAL STENOSIS OF LUMBAR REGION WITH NEUROGENIC CLAUDICATION: Primary | ICD-10-CM

## 2024-02-28 LAB
ALBUMIN SERPL-MCNC: 4.3 G/DL (ref 3.5–5.2)
ALBUMIN/GLOB SERPL: 1.5 G/DL
ALP SERPL-CCNC: 66 U/L (ref 39–117)
ALT SERPL W P-5'-P-CCNC: 23 U/L (ref 1–41)
ANION GAP SERPL CALCULATED.3IONS-SCNC: 11.1 MMOL/L (ref 5–15)
AST SERPL-CCNC: 22 U/L (ref 1–40)
BILIRUB SERPL-MCNC: 0.4 MG/DL (ref 0–1.2)
BUN SERPL-MCNC: 14 MG/DL (ref 6–20)
BUN/CREAT SERPL: 12.8 (ref 7–25)
CALCIUM SPEC-SCNC: 9.6 MG/DL (ref 8.6–10.5)
CHLORIDE SERPL-SCNC: 108 MMOL/L (ref 98–107)
CHOLEST SERPL-MCNC: 147 MG/DL (ref 0–200)
CO2 SERPL-SCNC: 23.9 MMOL/L (ref 22–29)
CREAT SERPL-MCNC: 1.09 MG/DL (ref 0.76–1.27)
EGFRCR SERPLBLD CKD-EPI 2021: 80.2 ML/MIN/1.73
EXPIRATION DATE: NORMAL
GLOBULIN UR ELPH-MCNC: 2.8 GM/DL
GLUCOSE SERPL-MCNC: 80 MG/DL (ref 65–99)
HBA1C MFR BLD: 5.7 % (ref 4.5–5.7)
HDLC SERPL-MCNC: 60 MG/DL (ref 40–60)
LDLC SERPL CALC-MCNC: 69 MG/DL (ref 0–100)
LDLC/HDLC SERPL: 1.12 {RATIO}
Lab: NORMAL
POTASSIUM SERPL-SCNC: 4.6 MMOL/L (ref 3.5–5.2)
PROT SERPL-MCNC: 7.1 G/DL (ref 6–8.5)
SODIUM SERPL-SCNC: 143 MMOL/L (ref 136–145)
TRIGL SERPL-MCNC: 99 MG/DL (ref 0–150)
VLDLC SERPL-MCNC: 18 MG/DL (ref 5–40)

## 2024-02-28 PROCEDURE — 80053 COMPREHEN METABOLIC PANEL: CPT | Performed by: STUDENT IN AN ORGANIZED HEALTH CARE EDUCATION/TRAINING PROGRAM

## 2024-02-28 PROCEDURE — 80061 LIPID PANEL: CPT | Performed by: STUDENT IN AN ORGANIZED HEALTH CARE EDUCATION/TRAINING PROGRAM

## 2024-02-28 PROCEDURE — 83525 ASSAY OF INSULIN: CPT | Performed by: STUDENT IN AN ORGANIZED HEALTH CARE EDUCATION/TRAINING PROGRAM

## 2024-02-28 RX ORDER — ACETAMINOPHEN 325 MG/1
TABLET ORAL
COMMUNITY
Start: 2023-12-04

## 2024-02-28 RX ORDER — CLOTRIMAZOLE 1 %
1 CREAM (GRAM) TOPICAL 2 TIMES DAILY
Qty: 60 G | Refills: 3 | Status: SHIPPED | OUTPATIENT
Start: 2024-02-28

## 2024-02-28 RX ORDER — IBUPROFEN 200 MG
TABLET ORAL
COMMUNITY
Start: 2023-12-04

## 2024-02-28 NOTE — PROGRESS NOTES
Follow Up Office Visit      Date: 2024   Patient Name: Greg Kee  : 1970   MRN: [id number]     Chief Complaint:    Chief Complaint   Patient presents with    Follow-up     4 week        History of Present Illness: Greg Kee is a 53 y.o. male who is here today to follow up with chronic care management.    History of Present Illness  The patient presents for evaluation of multiple medical concerns.    He missed his appointment with Dr. Brooks Hanson today morning as he could not reach him. Dr. Ole Mahmood ordered an MRI of his shoulders. He was supposed to meet with Dr. Hanson today at 9:00, but he could not make it to the appointment. Physical therapy was very painful for his shoulder.    Dr. Ole Mahmood prescribed him gabapentin for his degenerative disc disease, which he is still taking. He was offered an electronic stimulator, but it was out of service. He had to call Dr. Mahmood's office again to do the surgery on his back, but he did not call him because his blue end was finished. He does not know what to do on his back. He does not do any work, but walks in the morning. When he finishes walking about half a mile, he starts having shooting pain again. He takes 2 pills of gabapentin when he goes to sleep. He wakes up at 5:00 or 6:00 in the morning and his back hurts. The neurosurgeon told him that he is not going to clean any nerve, which has been pinched between L3 and L4.    He always breathes with one nostril. The other side is always almost blocked. He was supposed to wear hearing aids, but he could not do it. He was told that he needs surgery to cut some pieces, straighten out the wall inside of his nose. He bruises all the time.    He needs a refill on the clotrimazole cream. He only uses it 3 times a day.      Subjective      Review of Systems:   Review of Systems   All other systems reviewed and are negative.      I have reviewed the patients family history,  social history, past medical history, past surgical history and have updated it as appropriate.     Medications:     Current Outpatient Medications:     acetaminophen (Tylenol) 325 MG tablet, PRN OTC, Disp: , Rfl:     clotrimazole (LOTRIMIN) 1 % cream, Apply 1 Application topically to the appropriate area as directed 2 (Two) Times a Day., Disp: 60 g, Rfl: 3    gabapentin (NEURONTIN) 300 MG capsule, Take 1 capsule by mouth 4 (Four) Times a Day., Disp: , Rfl:     ibuprofen (ADVIL,MOTRIN) 200 MG tablet, PRN OTC, Disp: , Rfl:     ketoconazole (NIZORAL) 2 % shampoo, Apply  topically to the appropriate area as directed 2 (Two) Times a Week., Disp: 120 mL, Rfl: 1    NIFEdipine XL (PROCARDIA XL) 60 MG 24 hr tablet, Take 1 tablet by mouth Daily., Disp: 30 tablet, Rfl: 1    olmesartan (BENICAR) 40 MG tablet, Take 1 tablet by mouth Daily., Disp: 90 tablet, Rfl: 3    rosuvastatin (Crestor) 10 MG tablet, Take 1 tablet by mouth Daily., Disp: 90 tablet, Rfl: 3    Allergies:   No Known Allergies    Objective     Physical Exam: Please see above  Vital Signs:   Vitals:    02/28/24 1309   BP: 138/80   BP Location: Right arm   Patient Position: Sitting   Cuff Size: Adult   Pulse: 70   Resp: 20   Temp: 97.3 °F (36.3 °C)   TempSrc: Temporal   Weight: (!) 148 kg (326 lb 4 oz)   PainSc:   6   PainLoc: Back     Body mass index is 45.5 kg/m².          Physical Exam  Vitals and nursing note reviewed.   Constitutional:       General: He is not in acute distress.     Appearance: Normal appearance. He is obese. He is not ill-appearing or toxic-appearing.   HENT:      Nose: No congestion or rhinorrhea.   Eyes:      General:         Right eye: No discharge.         Left eye: No discharge.      Conjunctiva/sclera: Conjunctivae normal.   Pulmonary:      Effort: Pulmonary effort is normal. No respiratory distress.   Abdominal:      General: Abdomen is flat. There is no distension.   Musculoskeletal:      Cervical back: Normal range of motion.    Skin:     Coloration: Skin is not jaundiced.      Findings: No rash.   Neurological:      General: No focal deficit present.      Mental Status: He is alert. Mental status is at baseline.      Coordination: Coordination normal.      Gait: Gait normal.   Psychiatric:         Mood and Affect: Mood normal.         Behavior: Behavior normal.         Thought Content: Thought content normal.         Judgment: Judgment normal.         Procedures    Results:   Results  Laboratory Studies  Labs were reviewed with the patient.    Labs:   Hemoglobin A1C   Date Value Ref Range Status   02/28/2024 5.7 4.5 - 5.7 % Final     TSH   Date Value Ref Range Status   03/13/2023 1.680 0.270 - 4.200 uIU/mL Final        Imaging:   No valid procedures specified.     Assessment / Plan      Assessment/Plan:   Problem List Items Addressed This Visit       Primary hypertension    Degenerative disc disease at L5-S1 level    Relevant Orders    Ambulatory Referral to Orthopedic Surgery (Completed)    Rotator cuff arthropathy of both shoulders    Intertrigo    Relevant Medications    clotrimazole (LOTRIMIN) 1 % cream    Class 3 severe obesity due to excess calories with serious comorbidity and body mass index (BMI) of 40.0 to 44.9 in adult    Relevant Orders    POC Glycosylated Hemoglobin (Hb A1C) (Completed)    Comprehensive Metabolic Panel (Completed)    Insulin, Total (Completed)    Pure hypercholesterolemia    Relevant Orders    Lipid Panel (Completed)    Spinal stenosis of lumbar region with neurogenic claudication - Primary    Relevant Orders    Ambulatory Referral to Orthopedic Surgery (Completed)    Nose septum deviation    Relevant Orders    Ambulatory Referral to ENT (Otolaryngology) (Completed)    Insulin resistance    Overview     3/4/24: Homeostatic Model Assessment for Insulin Resistance > 20 indicating severe insulin resistance            Assessment & Plan  1. Shoulder pain.  Our  will work on getting him an appointment  with them.    2. Degenerative disc disease.  We discussed that even if they did a surgery, it is not guaranteed to reduce the pain. It just ensures the structural stability of his back, but his pain could be worse, better, or the same. If the structure of his spine is intact, then a lot of times we have to go through different methods such as physical therapy, weight loss, interventional pain management, and topical therapy. Neurosurgery can discuss with him if they think putting metal in his back would improve the likelihood that it would not degenerate even further to cause nerve impingement. I will refer him to a spine specialist for a second opinion.    3. Nasal congestion.  He has a septum deviation. We will schedule him for a nasal endoscopy.    4. Fungal infection.  I will send in a refill for clotrimazole cream.    5. Health maintenance.  His blood pressure looks great. I will order blood work.    Follow-up  The patient will follow up for his annual visit.    Follow Up:   Return in about 3 months (around 5/28/2024) for Annual.    I spent 30 minutes caring for Greg on this date of service. This time includes time spent by me in the following activities: preparing for the visit, reviewing tests, obtaining and/or reviewing a separately obtained history, performing a medically appropriate examination and/or evaluation, counseling and educating the patient/family/caregiver, and referring and communicating with other health care professionals.     Patient or patient representative verbalized consent for the use of Ambient Listening during the visit with  Scooby Ochoa MD for chart documentation. 3/4/2024  13:37 CHAYITO Ochoa MD  [facility]

## 2024-03-01 LAB — INSULIN SERPL-ACNC: 150 UIU/ML (ref 2.6–24.9)

## 2024-03-04 PROBLEM — E88.819 INSULIN RESISTANCE: Status: ACTIVE | Noted: 2024-03-04

## 2024-03-05 ENCOUNTER — TELEPHONE (OUTPATIENT)
Dept: INTERNAL MEDICINE | Facility: CLINIC | Age: 54
End: 2024-03-05
Payer: MEDICAID

## 2024-03-05 NOTE — TELEPHONE ENCOUNTER
Pt mobile as well as emergency contact mobile are no longer available. If pt returns to clinic, please update in chart     Lab letter sent

## 2024-03-05 NOTE — TELEPHONE ENCOUNTER
----- Message from Scooby Ochoa MD sent at 3/4/2024 10:10 PM EST -----  Please let the patient know that his labs indicate that he is significantly insulin resistant, which would likely predispose him to diabetes in the future.  Please let me know if you would be willing to initiate a once weekly injectable medication to help improve insulin resistance and decrease the risk of diabetes, while also helping with weight loss.  Please also let him know that his cholesterol has significantly improved since this was last checked approximately 11 months ago.  He should continue to take the cholesterol medication as prescribed to minimize his cardiovascular risk.  Thanks for coordinating.

## 2024-03-15 DIAGNOSIS — I10 PRIMARY HYPERTENSION: ICD-10-CM

## 2024-03-15 DIAGNOSIS — L21.9 SEBORRHEIC DERMATITIS: ICD-10-CM

## 2024-03-15 RX ORDER — KETOCONAZOLE 20 MG/ML
SHAMPOO TOPICAL
Qty: 120 ML | Refills: 1 | Status: SHIPPED | OUTPATIENT
Start: 2024-03-15

## 2024-03-15 RX ORDER — NIFEDIPINE 60 MG/1
TABLET, EXTENDED RELEASE ORAL
Qty: 30 TABLET | Refills: 1 | Status: SHIPPED | OUTPATIENT
Start: 2024-03-15

## 2024-03-27 DIAGNOSIS — E78.00 PURE HYPERCHOLESTEROLEMIA: ICD-10-CM

## 2024-03-27 RX ORDER — ROSUVASTATIN CALCIUM 10 MG/1
10 TABLET, COATED ORAL DAILY
Qty: 90 TABLET | Refills: 2 | Status: SHIPPED | OUTPATIENT
Start: 2024-03-27

## 2024-04-16 ENCOUNTER — PRE-ADMISSION TESTING (OUTPATIENT)
Dept: PREADMISSION TESTING | Facility: HOSPITAL | Age: 54
End: 2024-04-16
Payer: MEDICAID

## 2024-04-16 ENCOUNTER — HOSPITAL ENCOUNTER (OUTPATIENT)
Dept: GENERAL RADIOLOGY | Facility: HOSPITAL | Age: 54
Discharge: HOME | End: 2024-04-16
Payer: MEDICAID

## 2024-04-16 VITALS — HEIGHT: 69 IN | WEIGHT: 315 LBS | BODY MASS INDEX: 46.65 KG/M2

## 2024-04-16 LAB
ANION GAP SERPL CALCULATED.3IONS-SCNC: 10 MMOL/L (ref 5–15)
BUN SERPL-MCNC: 13 MG/DL (ref 6–20)
BUN/CREAT SERPL: 15.5 (ref 7–25)
CALCIUM SPEC-SCNC: 9.1 MG/DL (ref 8.6–10.5)
CHLORIDE SERPL-SCNC: 106 MMOL/L (ref 98–107)
CO2 SERPL-SCNC: 25 MMOL/L (ref 22–29)
CREAT SERPL-MCNC: 0.84 MG/DL (ref 0.76–1.27)
DEPRECATED RDW RBC AUTO: 43.6 FL (ref 37–54)
EGFRCR SERPLBLD CKD-EPI 2021: 103 ML/MIN/1.73
ERYTHROCYTE [DISTWIDTH] IN BLOOD BY AUTOMATED COUNT: 12.9 % (ref 12.3–15.4)
GLUCOSE SERPL-MCNC: 140 MG/DL (ref 65–99)
HBA1C MFR BLD: 5.3 % (ref 4.8–5.6)
HCT VFR BLD AUTO: 42.8 % (ref 37.5–51)
HGB BLD-MCNC: 14 G/DL (ref 13–17.7)
MCH RBC QN AUTO: 30 PG (ref 26.6–33)
MCHC RBC AUTO-ENTMCNC: 32.7 G/DL (ref 31.5–35.7)
MCV RBC AUTO: 91.8 FL (ref 79–97)
PLATELET # BLD AUTO: 191 10*3/MM3 (ref 140–450)
PMV BLD AUTO: 9.9 FL (ref 6–12)
POTASSIUM SERPL-SCNC: 4.5 MMOL/L (ref 3.5–5.2)
RBC # BLD AUTO: 4.66 10*6/MM3 (ref 4.14–5.8)
SODIUM SERPL-SCNC: 141 MMOL/L (ref 136–145)
WBC NRBC COR # BLD AUTO: 4.99 10*3/MM3 (ref 3.4–10.8)

## 2024-04-16 PROCEDURE — 80048 BASIC METABOLIC PNL TOTAL CA: CPT

## 2024-04-16 PROCEDURE — 71046 X-RAY EXAM CHEST 2 VIEWS: CPT

## 2024-04-16 PROCEDURE — 85027 COMPLETE CBC AUTOMATED: CPT

## 2024-04-16 PROCEDURE — 83036 HEMOGLOBIN GLYCOSYLATED A1C: CPT

## 2024-04-16 PROCEDURE — 93005 ELECTROCARDIOGRAM TRACING: CPT

## 2024-04-16 PROCEDURE — 36415 COLL VENOUS BLD VENIPUNCTURE: CPT

## 2024-04-16 NOTE — PAT
An arrival time for procedure was not provided during PAT visit. If patient had any questions or concerns about their arrival time, they were instructed to contact their surgeon/physician.  Additionally, if the patient referred to an arrival time that was acquired from their my chart account, patient was encouraged to verify that time with their surgeon/physician. Arrival times are NOT provided in Pre Admission Testing Department.    Per Anesthesia Request, patient instructed not to take their ACE/ARB medications on the AM of surgery.    PATIENT RECEIVED INSTRUCTIONS ON HOW TO AND WHEN TO USE BENZOYL WASH. PATIENT TO  FROM [facility] IN [place], [place]. PATIENT VERBALIZED UNDERSTANDING.     Post-Surgery Information Instruction Sheet given to patient during Pre-Admission Testing Visit with verbal instructions to patient to return with PAT PASS on the day of surgery. Additionally, encouraged patient to review the information provided.    InfuBLOCK ([employer]) pain pump patient informational handout given to patient.  Instructed patient to watch InfuBLOCK Patient Education Video regarding Peripheral Nerve Catheter that will be in place for upcoming surgery unless contraindicated. The video can be accessed using QR code noted on handout.  Patient agreed to watch video.  Stressed to patient to call [email or web address] Nursing Hotline 24/7 if patient has any questions or concerns after discharge.     Patient denies any current skin issues.     PATIENT EKG ON CHART AND IN EPIC FROM 04/162024

## 2024-04-18 LAB
QT INTERVAL: 374 MS
QTC INTERVAL: 412 MS

## 2024-04-29 ENCOUNTER — ANESTHESIA EVENT (OUTPATIENT)
Dept: PERIOP | Facility: HOSPITAL | Age: 54
End: 2024-04-29
Payer: MEDICAID

## 2024-04-29 RX ORDER — SODIUM CHLORIDE 0.9 % (FLUSH) 0.9 %
10 SYRINGE (ML) INJECTION EVERY 12 HOURS SCHEDULED
Status: CANCELLED | OUTPATIENT
Start: 2024-04-29

## 2024-04-29 RX ORDER — SODIUM CHLORIDE 0.9 % (FLUSH) 0.9 %
10 SYRINGE (ML) INJECTION AS NEEDED
Status: CANCELLED | OUTPATIENT
Start: 2024-04-29

## 2024-04-29 RX ORDER — FAMOTIDINE 10 MG/ML
20 INJECTION, SOLUTION INTRAVENOUS ONCE
Status: CANCELLED | OUTPATIENT
Start: 2024-04-29 | End: 2024-04-29

## 2024-04-30 ENCOUNTER — ANESTHESIA EVENT CONVERTED (OUTPATIENT)
Dept: ANESTHESIOLOGY | Facility: HOSPITAL | Age: 54
End: 2024-04-30
Payer: MEDICAID

## 2024-04-30 ENCOUNTER — APPOINTMENT (OUTPATIENT)
Dept: GENERAL RADIOLOGY | Facility: HOSPITAL | Age: 54
End: 2024-04-30
Payer: MEDICAID

## 2024-04-30 ENCOUNTER — HOSPITAL ENCOUNTER (OUTPATIENT)
Facility: HOSPITAL | Age: 54
Setting detail: HOSPITAL OUTPATIENT SURGERY
Discharge: HOME | End: 2024-04-30
Attending: ORTHOPAEDIC SURGERY | Admitting: ORTHOPAEDIC SURGERY
Payer: MEDICAID

## 2024-04-30 ENCOUNTER — ANESTHESIA (OUTPATIENT)
Dept: PERIOP | Facility: HOSPITAL | Age: 54
End: 2024-04-30
Payer: MEDICAID

## 2024-04-30 VITALS
TEMPERATURE: 97.6 F | BODY MASS INDEX: 46.65 KG/M2 | WEIGHT: 315 LBS | RESPIRATION RATE: 16 BRPM | HEIGHT: 69 IN | DIASTOLIC BLOOD PRESSURE: 95 MMHG | SYSTOLIC BLOOD PRESSURE: 129 MMHG | OXYGEN SATURATION: 95 % | HEART RATE: 72 BPM

## 2024-04-30 DIAGNOSIS — M19.011 PRIMARY OSTEOARTHRITIS OF RIGHT SHOULDER: Primary | ICD-10-CM

## 2024-04-30 LAB
GLUCOSE BLDC GLUCOMTR-MCNC: 96 MG/DL (ref 70–130)
POTASSIUM SERPL-SCNC: 4.1 MMOL/L (ref 3.5–5.2)

## 2024-04-30 PROCEDURE — 25010000002 CEFAZOLIN PER 500 MG: Performed by: ORTHOPAEDIC SURGERY

## 2024-04-30 PROCEDURE — 76000 FLUOROSCOPY <1 HR PHYS/QHP: CPT

## 2024-04-30 PROCEDURE — C1713 ANCHOR/SCREW BN/BN,TIS/BN: HCPCS | Performed by: ORTHOPAEDIC SURGERY

## 2024-04-30 PROCEDURE — 25010000002 FENTANYL CITRATE (PF) 50 MCG/ML SOLUTION

## 2024-04-30 PROCEDURE — 25010000002 ONDANSETRON PER 1 MG: Performed by: ANESTHESIOLOGY

## 2024-04-30 PROCEDURE — 25010000002 PROPOFOL 10 MG/ML EMULSION: Performed by: ANESTHESIOLOGY

## 2024-04-30 PROCEDURE — 97550 CAREGIVER TRAING 1ST 30 MIN: CPT | Performed by: OCCUPATIONAL THERAPIST

## 2024-04-30 PROCEDURE — 25010000002 PHENYLEPHRINE 10 MG/ML SOLUTION 1 ML VIAL: Performed by: ANESTHESIOLOGY

## 2024-04-30 PROCEDURE — C1776 JOINT DEVICE (IMPLANTABLE): HCPCS | Performed by: ORTHOPAEDIC SURGERY

## 2024-04-30 PROCEDURE — 73030 X-RAY EXAM OF SHOULDER: CPT

## 2024-04-30 PROCEDURE — 25010000002 ONDANSETRON PER 1 MG

## 2024-04-30 PROCEDURE — 97165 OT EVAL LOW COMPLEX 30 MIN: CPT | Performed by: OCCUPATIONAL THERAPIST

## 2024-04-30 PROCEDURE — 25010000002 FENTANYL CITRATE (PF) 50 MCG/ML SOLUTION: Performed by: NURSE ANESTHETIST, CERTIFIED REGISTERED

## 2024-04-30 PROCEDURE — 97110 THERAPEUTIC EXERCISES: CPT | Performed by: OCCUPATIONAL THERAPIST

## 2024-04-30 PROCEDURE — 97116 GAIT TRAINING THERAPY: CPT

## 2024-04-30 PROCEDURE — 97530 THERAPEUTIC ACTIVITIES: CPT | Performed by: OCCUPATIONAL THERAPIST

## 2024-04-30 PROCEDURE — 97535 SELF CARE MNGMENT TRAINING: CPT | Performed by: OCCUPATIONAL THERAPIST

## 2024-04-30 PROCEDURE — 25810000003 LACTATED RINGERS PER 1000 ML: Performed by: ANESTHESIOLOGY

## 2024-04-30 PROCEDURE — 25010000002 PROPOFOL 10 MG/ML EMULSION

## 2024-04-30 PROCEDURE — 25010000002 BUPIVACAINE (PF) 0.25 % SOLUTION: Performed by: NURSE ANESTHETIST, CERTIFIED REGISTERED

## 2024-04-30 PROCEDURE — 82948 REAGENT STRIP/BLOOD GLUCOSE: CPT

## 2024-04-30 PROCEDURE — 25810000003 LACTATED RINGERS PER 1000 ML

## 2024-04-30 PROCEDURE — 25010000002 DEXAMETHASONE PER 1 MG

## 2024-04-30 PROCEDURE — 97161 PT EVAL LOW COMPLEX 20 MIN: CPT

## 2024-04-30 PROCEDURE — 84132 ASSAY OF SERUM POTASSIUM: CPT | Performed by: ORTHOPAEDIC SURGERY

## 2024-04-30 PROCEDURE — 25010000002 SUGAMMADEX 200 MG/2ML SOLUTION: Performed by: ANESTHESIOLOGY

## 2024-04-30 PROCEDURE — 25010000002 CEFTRIAXONE PER 250 MG: Performed by: ORTHOPAEDIC SURGERY

## 2024-04-30 PROCEDURE — 25010000002 ROPIVACAINE HCL-NACL 0.2-0.9 % SOLUTION: Performed by: NURSE ANESTHETIST, CERTIFIED REGISTERED

## 2024-04-30 PROCEDURE — 25010000002 DEXAMETHASONE SODIUM PHOSPHATE 10 MG/ML SOLUTION: Performed by: NURSE ANESTHETIST, CERTIFIED REGISTERED

## 2024-04-30 DEVICE — NK HUM/SHLDR ALTIVATE ANAT NTRL: Type: IMPLANTABLE DEVICE | Site: SHOULDER | Status: FUNCTIONAL

## 2024-04-30 DEVICE — IMPLANTABLE DEVICE: Type: IMPLANTABLE DEVICE | Site: SHOULDER | Status: FUNCTIONAL

## 2024-04-30 DEVICE — CMT BONE PALACOS R HI/VISC 1X40: Type: IMPLANTABLE DEVICE | Site: SHOULDER | Status: FUNCTIONAL

## 2024-04-30 DEVICE — TOTL SHLDR DJO: Type: IMPLANTABLE DEVICE | Site: SHOULDER | Status: FUNCTIONAL

## 2024-04-30 DEVICE — SUT NONABS BONE DYNACORD UHMWPE W/OS/6 NDL 2PK STRIP/BLU: Type: IMPLANTABLE DEVICE | Site: SHOULDER | Status: FUNCTIONAL

## 2024-04-30 RX ORDER — SODIUM CHLORIDE 9 MG/ML
40 INJECTION, SOLUTION INTRAVENOUS AS NEEDED
Status: DISCONTINUED | OUTPATIENT
Start: 2024-04-30 | End: 2024-04-30 | Stop reason: HOSPADM

## 2024-04-30 RX ORDER — DEXAMETHASONE SODIUM PHOSPHATE 4 MG/ML
INJECTION, SOLUTION INTRA-ARTICULAR; INTRALESIONAL; INTRAMUSCULAR; INTRAVENOUS; SOFT TISSUE AS NEEDED
Status: DISCONTINUED | OUTPATIENT
Start: 2024-04-30 | End: 2024-04-30 | Stop reason: SURG

## 2024-04-30 RX ORDER — DEXAMETHASONE SODIUM PHOSPHATE 10 MG/ML
INJECTION, SOLUTION INTRAMUSCULAR; INTRAVENOUS
Status: COMPLETED | OUTPATIENT
Start: 2024-04-30 | End: 2024-04-30

## 2024-04-30 RX ORDER — NALOXONE HCL 0.4 MG/ML
0.4 VIAL (ML) INJECTION AS NEEDED
Status: DISCONTINUED | OUTPATIENT
Start: 2024-04-30 | End: 2024-04-30 | Stop reason: HOSPADM

## 2024-04-30 RX ORDER — BUPIVACAINE HYDROCHLORIDE 2.5 MG/ML
INJECTION, SOLUTION EPIDURAL; INFILTRATION; INTRACAUDAL
Status: COMPLETED | OUTPATIENT
Start: 2024-04-30 | End: 2024-04-30

## 2024-04-30 RX ORDER — PHENYLEPHRINE HCL IN 0.9% NACL 1 MG/10 ML
SYRINGE (ML) INTRAVENOUS AS NEEDED
Status: DISCONTINUED | OUTPATIENT
Start: 2024-04-30 | End: 2024-04-30 | Stop reason: SURG

## 2024-04-30 RX ORDER — SODIUM CHLORIDE, SODIUM LACTATE, POTASSIUM CHLORIDE, CALCIUM CHLORIDE 600; 310; 30; 20 MG/100ML; MG/100ML; MG/100ML; MG/100ML
9 INJECTION, SOLUTION INTRAVENOUS CONTINUOUS
Status: DISCONTINUED | OUTPATIENT
Start: 2024-04-30 | End: 2024-04-30 | Stop reason: HOSPADM

## 2024-04-30 RX ORDER — FAMOTIDINE 20 MG/1
20 TABLET, FILM COATED ORAL ONCE
Status: COMPLETED | OUTPATIENT
Start: 2024-04-30 | End: 2024-04-30

## 2024-04-30 RX ORDER — PROPOFOL 10 MG/ML
VIAL (ML) INTRAVENOUS AS NEEDED
Status: DISCONTINUED | OUTPATIENT
Start: 2024-04-30 | End: 2024-04-30 | Stop reason: SURG

## 2024-04-30 RX ORDER — FENTANYL CITRATE 50 UG/ML
50 INJECTION, SOLUTION INTRAMUSCULAR; INTRAVENOUS
Status: DISCONTINUED | OUTPATIENT
Start: 2024-04-30 | End: 2024-04-30 | Stop reason: HOSPADM

## 2024-04-30 RX ORDER — FENTANYL CITRATE 50 UG/ML
INJECTION, SOLUTION INTRAMUSCULAR; INTRAVENOUS
Status: COMPLETED
Start: 2024-04-30 | End: 2024-04-30

## 2024-04-30 RX ORDER — SODIUM CHLORIDE, SODIUM LACTATE, POTASSIUM CHLORIDE, CALCIUM CHLORIDE 600; 310; 30; 20 MG/100ML; MG/100ML; MG/100ML; MG/100ML
INJECTION, SOLUTION INTRAVENOUS CONTINUOUS PRN
Status: DISCONTINUED | OUTPATIENT
Start: 2024-04-30 | End: 2024-04-30 | Stop reason: SURG

## 2024-04-30 RX ORDER — ONDANSETRON 2 MG/ML
4 INJECTION INTRAMUSCULAR; INTRAVENOUS ONCE AS NEEDED
Status: COMPLETED | OUTPATIENT
Start: 2024-04-30 | End: 2024-04-30

## 2024-04-30 RX ORDER — SODIUM CHLORIDE, SODIUM LACTATE, POTASSIUM CHLORIDE, CALCIUM CHLORIDE 600; 310; 30; 20 MG/100ML; MG/100ML; MG/100ML; MG/100ML
100 INJECTION, SOLUTION INTRAVENOUS CONTINUOUS
Status: DISCONTINUED | OUTPATIENT
Start: 2024-04-30 | End: 2024-04-30 | Stop reason: HOSPADM

## 2024-04-30 RX ORDER — LIDOCAINE HYDROCHLORIDE 10 MG/ML
0.5 INJECTION, SOLUTION EPIDURAL; INFILTRATION; INTRACAUDAL; PERINEURAL ONCE AS NEEDED
Status: COMPLETED | OUTPATIENT
Start: 2024-04-30 | End: 2024-04-30

## 2024-04-30 RX ORDER — ROCURONIUM BROMIDE 10 MG/ML
INJECTION, SOLUTION INTRAVENOUS AS NEEDED
Status: DISCONTINUED | OUTPATIENT
Start: 2024-04-30 | End: 2024-04-30 | Stop reason: SURG

## 2024-04-30 RX ORDER — ONDANSETRON 2 MG/ML
INJECTION INTRAMUSCULAR; INTRAVENOUS AS NEEDED
Status: DISCONTINUED | OUTPATIENT
Start: 2024-04-30 | End: 2024-04-30 | Stop reason: SURG

## 2024-04-30 RX ORDER — ACETAMINOPHEN 500 MG
1000 TABLET ORAL ONCE
Status: COMPLETED | OUTPATIENT
Start: 2024-04-30 | End: 2024-04-30

## 2024-04-30 RX ORDER — MEPERIDINE HYDROCHLORIDE 25 MG/ML
12.5 INJECTION INTRAMUSCULAR; INTRAVENOUS; SUBCUTANEOUS
Status: DISCONTINUED | OUTPATIENT
Start: 2024-04-30 | End: 2024-04-30 | Stop reason: HOSPADM

## 2024-04-30 RX ORDER — LIDOCAINE HYDROCHLORIDE 10 MG/ML
INJECTION, SOLUTION EPIDURAL; INFILTRATION; INTRACAUDAL; PERINEURAL AS NEEDED
Status: DISCONTINUED | OUTPATIENT
Start: 2024-04-30 | End: 2024-04-30 | Stop reason: SURG

## 2024-04-30 RX ORDER — ONDANSETRON 4 MG/1
4 TABLET, FILM COATED ORAL EVERY 8 HOURS PRN
Qty: 12 TABLET | Refills: 0 | Status: SHIPPED | OUTPATIENT
Start: 2024-04-30

## 2024-04-30 RX ORDER — HYDROCODONE BITARTRATE AND ACETAMINOPHEN 7.5; 325 MG/1; MG/1
1 TABLET ORAL EVERY 6 HOURS PRN
Qty: 24 TABLET | Refills: 0 | Status: SHIPPED | OUTPATIENT
Start: 2024-04-30

## 2024-04-30 RX ORDER — EPHEDRINE SULFATE 50 MG/ML
5 INJECTION, SOLUTION INTRAVENOUS ONCE AS NEEDED
Status: DISCONTINUED | OUTPATIENT
Start: 2024-04-30 | End: 2024-04-30 | Stop reason: HOSPADM

## 2024-04-30 RX ORDER — HYDROMORPHONE HYDROCHLORIDE 1 MG/ML
0.5 INJECTION, SOLUTION INTRAMUSCULAR; INTRAVENOUS; SUBCUTANEOUS
Status: DISCONTINUED | OUTPATIENT
Start: 2024-04-30 | End: 2024-04-30 | Stop reason: HOSPADM

## 2024-04-30 RX ORDER — CEFAZOLIN SODIUM IN 0.9 % NACL 3 G/100 ML
3000 INTRAVENOUS SOLUTION, PIGGYBACK (ML) INTRAVENOUS
Status: COMPLETED | OUTPATIENT
Start: 2024-04-30 | End: 2024-04-30

## 2024-04-30 RX ORDER — BUPIVACAINE HYDROCHLORIDE 2.5 MG/ML
INJECTION, SOLUTION EPIDURAL; INFILTRATION; INTRACAUDAL
Status: DISCONTINUED
Start: 2024-04-30 | End: 2024-04-30 | Stop reason: HOSPADM

## 2024-04-30 RX ORDER — ROPIVACAINE HYDROCHLORIDE 2 MG/ML
INJECTION, SOLUTION EPIDURAL; INFILTRATION; PERINEURAL CONTINUOUS
Status: DISCONTINUED | OUTPATIENT
Start: 2024-04-30 | End: 2024-04-30 | Stop reason: HOSPADM

## 2024-04-30 RX ORDER — TRANEXAMIC ACID 10 MG/ML
1000 INJECTION, SOLUTION INTRAVENOUS ONCE
Status: COMPLETED | OUTPATIENT
Start: 2024-04-30 | End: 2024-04-30

## 2024-04-30 RX ORDER — FENTANYL CITRATE 50 UG/ML
INJECTION, SOLUTION INTRAMUSCULAR; INTRAVENOUS
Status: COMPLETED | OUTPATIENT
Start: 2024-04-30 | End: 2024-04-30

## 2024-04-30 RX ORDER — DOCUSATE SODIUM 100 MG/1
100 CAPSULE, LIQUID FILLED ORAL 2 TIMES DAILY
Qty: 20 CAPSULE | Refills: 0 | Status: SHIPPED | OUTPATIENT
Start: 2024-04-30

## 2024-04-30 RX ORDER — ONDANSETRON 2 MG/ML
INJECTION INTRAMUSCULAR; INTRAVENOUS
Status: COMPLETED
Start: 2024-04-30 | End: 2024-04-30

## 2024-04-30 RX ORDER — MELOXICAM 15 MG/1
15 TABLET ORAL ONCE
Status: COMPLETED | OUTPATIENT
Start: 2024-04-30 | End: 2024-04-30

## 2024-04-30 RX ORDER — MAGNESIUM HYDROXIDE 1200 MG/15ML
LIQUID ORAL AS NEEDED
Status: DISCONTINUED | OUTPATIENT
Start: 2024-04-30 | End: 2024-04-30 | Stop reason: HOSPADM

## 2024-04-30 RX ORDER — MIDAZOLAM HYDROCHLORIDE 1 MG/ML
1 INJECTION INTRAMUSCULAR; INTRAVENOUS
Status: DISCONTINUED | OUTPATIENT
Start: 2024-04-30 | End: 2024-04-30 | Stop reason: HOSPADM

## 2024-04-30 RX ADMIN — ROCURONIUM BROMIDE 20 MG: 10 INJECTION INTRAVENOUS at 08:36

## 2024-04-30 RX ADMIN — FENTANYL CITRATE 50 MCG: 50 INJECTION, SOLUTION INTRAMUSCULAR; INTRAVENOUS at 12:00

## 2024-04-30 RX ADMIN — PHENYLEPHRINE HYDROCHLORIDE 0.5 MCG/MIN: 10 INJECTION INTRAVENOUS at 08:19

## 2024-04-30 RX ADMIN — ROCURONIUM BROMIDE 50 MG: 10 INJECTION INTRAVENOUS at 07:34

## 2024-04-30 RX ADMIN — ROCURONIUM BROMIDE 10 MG: 10 INJECTION INTRAVENOUS at 09:23

## 2024-04-30 RX ADMIN — CEFAZOLIN 3000 MG: 10 INJECTION, POWDER, FOR SOLUTION INTRAVENOUS at 07:41

## 2024-04-30 RX ADMIN — DEXAMETHASONE SODIUM PHOSPHATE 2 MG: 10 INJECTION, SOLUTION INTRAMUSCULAR; INTRAVENOUS at 07:18

## 2024-04-30 RX ADMIN — SUGAMMADEX 200 MG: 100 INJECTION, SOLUTION INTRAVENOUS at 10:49

## 2024-04-30 RX ADMIN — ROCURONIUM BROMIDE 10 MG: 10 INJECTION INTRAVENOUS at 09:53

## 2024-04-30 RX ADMIN — TRANEXAMIC ACID 1000 MG: 10 INJECTION, SOLUTION INTRAVENOUS at 09:31

## 2024-04-30 RX ADMIN — Medication 100 MCG: at 08:01

## 2024-04-30 RX ADMIN — LIDOCAINE HYDROCHLORIDE 0.5 ML: 10 INJECTION, SOLUTION EPIDURAL; INFILTRATION; INTRACAUDAL; PERINEURAL at 06:24

## 2024-04-30 RX ADMIN — SODIUM CHLORIDE, POTASSIUM CHLORIDE, SODIUM LACTATE AND CALCIUM CHLORIDE 9 ML/HR: 600; 310; 30; 20 INJECTION, SOLUTION INTRAVENOUS at 06:24

## 2024-04-30 RX ADMIN — ONDANSETRON 4 MG: 2 INJECTION INTRAMUSCULAR; INTRAVENOUS at 11:45

## 2024-04-30 RX ADMIN — Medication 200 MCG: at 08:06

## 2024-04-30 RX ADMIN — TRANEXAMIC ACID 1000 MG: 10 INJECTION, SOLUTION INTRAVENOUS at 07:54

## 2024-04-30 RX ADMIN — ACETAMINOPHEN 1000 MG: 500 TABLET ORAL at 06:24

## 2024-04-30 RX ADMIN — DEXAMETHASONE SODIUM PHOSPHATE 4 MG: 4 INJECTION INTRA-ARTICULAR; INTRALESIONAL; INTRAMUSCULAR; INTRAVENOUS; SOFT TISSUE at 07:34

## 2024-04-30 RX ADMIN — PROPOFOL 200 MG: 10 INJECTION, EMULSION INTRAVENOUS at 07:34

## 2024-04-30 RX ADMIN — BUPIVACAINE HYDROCHLORIDE 10 ML: 2.5 INJECTION, SOLUTION EPIDURAL; INFILTRATION; INTRACAUDAL at 07:10

## 2024-04-30 RX ADMIN — PROPOFOL 15 MCG/KG/MIN: 10 INJECTION, EMULSION INTRAVENOUS at 07:45

## 2024-04-30 RX ADMIN — SODIUM CHLORIDE, POTASSIUM CHLORIDE, SODIUM LACTATE AND CALCIUM CHLORIDE: 600; 310; 30; 20 INJECTION, SOLUTION INTRAVENOUS at 07:31

## 2024-04-30 RX ADMIN — FENTANYL CITRATE 50 MCG: 50 INJECTION, SOLUTION INTRAMUSCULAR; INTRAVENOUS at 11:12

## 2024-04-30 RX ADMIN — BUPIVACAINE HYDROCHLORIDE 30 ML: 2.5 INJECTION, SOLUTION EPIDURAL; INFILTRATION; INTRACAUDAL; PERINEURAL at 07:18

## 2024-04-30 RX ADMIN — Medication 200 MCG: at 07:57

## 2024-04-30 RX ADMIN — FENTANYL CITRATE 100 MCG: 50 INJECTION, SOLUTION INTRAMUSCULAR; INTRAVENOUS at 07:10

## 2024-04-30 RX ADMIN — Medication 1000 MG: at 11:10

## 2024-04-30 RX ADMIN — MELOXICAM 15 MG: 15 TABLET ORAL at 06:24

## 2024-04-30 RX ADMIN — FAMOTIDINE 20 MG: 20 TABLET, FILM COATED ORAL at 06:24

## 2024-04-30 RX ADMIN — ONDANSETRON 4 MG: 2 INJECTION INTRAMUSCULAR; INTRAVENOUS at 09:34

## 2024-04-30 RX ADMIN — LIDOCAINE HYDROCHLORIDE 50 MG: 10 INJECTION, SOLUTION EPIDURAL; INFILTRATION; INTRACAUDAL; PERINEURAL at 07:34

## 2024-04-30 RX ADMIN — Medication 100 MCG: at 08:18

## 2024-04-30 RX ADMIN — ROCURONIUM BROMIDE 10 MG: 10 INJECTION INTRAVENOUS at 10:18

## 2024-04-30 NOTE — OP NOTE
DATE OF OPERATION: 04/30/24  PREOPERATIVE DIAGNOSIS: right shoulder degenerative joint disease with pain and limitation of function and motion.    POSTOPERATIVE DIAGNOSES:  1. right shoulder degenerative joint disease with pain and limitation of function and motion.    2. Biceps tenosynovitis.    PROCEDURES PERFORMED:  1. right total shoulder arthroplasty.    2. right biceps tenodesis.        COMPLICATIONS: None.    DISPOSITION: Recovery room in stable condition.    Staff:  Surgeon(s):  Brooks Juarez Jr., MD    Circulator: Rajesh Samuel RN  Radiology Technologist: Leandro Spain RT  Scrub Person: Xavier Phan  Vendor Representative: Josselin Purvis  Nursing Assistant: Primo Bob  Assistant: Swetha House PA-C     Assistant: Swetha House PA-C  was responsible for performing the following activities: Retraction, Suction, Irrigation, Suturing, Closing, and Placing Dressing and their skilled assistance was necessary for the success of this case.    SURGICAL APPROACH: Deltopectoral      SURGICAL TECHNIQUE: subscapularis sparing         Anesthesia: General with Block    Estimated Blood Loss: 300 mL    Implants:    Implant Name Type Inv. Item Serial No.  Lot No. LRB No. Used Action   CMT BONE PALACOS R HI/VISC 1X40 - [id number] Implant CMT BONE PALACOS R HI/VISC 1X40  [facility] [id number] Right 1 Implanted   SUT NONABS BONE DYNACORD UHMWPE W/OS/6 NDL 2PK STRIP/KAILYN - [id number] Implant SUT NONABS BONE DYNACORD UHMWPE W/OS/6 NDL 2PK STRIP/KAILYN  [employer] [id number] Right 2 Implanted   JOCELYNN ADOLFO ALTIVATE A/POLY PEG E/PLS SZ50 - [id number] Implant JOCELYNN ADOLFO ALTIVATE A/POLY PEG E/PLS SZ50  [employer] [id number] Right 1 Implanted   NK HUM/SHLDR ALTIVATE ADOLFO NTRL - [id number] Implant NK HUM/SHLDR ALTIVATE ADOLFO NTRL  [employer] [id number] Right 1 Implanted   NK STEM HUM/SHLDR ALTIVATE ADOLFO SHT 8MM - [id number] Implant NK STEM HUM/SHLDR ALTIVATE ADOLFO SHT 8MM  [employer] [id number] Right  1 Implanted   HD HUM/SHLDR ALTIVATE ADOLFO STD/OFFST COCR [id number] - [id number] Implant HD HUM/SHLDR ALTIVATE ADOLFO STD/OFFST COCR [id number]  [employer] [id number] Right 1 Implanted        INDICATIONS: This is a 53-year-old male with right shoulder pain and limited function and motion secondary to DJD. They have failed conservative treatment and after a discussion of risks, benefits, and alternatives, wished to proceed with shoulder arthroplasty.    DESCRIPTION OF PROCEDURE: On the day of surgery, we identified the right shoulder as the correct operative extremity. This was initialed by the surgeon with the patient's acknowledgment. The patient underwent placement of an interscalene block and was taken to the operating room and placed in the supine position. Upon induction of adequate anesthesia, the patient was brought up to the relaxed beach chair position (approximately 30 degrees) and the shoulder and upper extremity were prepped and draped in the usual sterile fashion. Timeout confirmed the correct patient and operative extremity as well as that antibiotics were on board. A standard deltopectoral approach to the shoulder was carried out. It was carried sharply through the skin and subcutaneous tissue. Medial and lateral flaps were developed over the deltopectoral fascia. The cephalic vein was identified and mobilized laterally with the deltoid. The subdeltoid and subpectoral spaces were mobilized and a blunt retractor was placed deep to this. The leading edge of the pectoralis was released 1cm exposing the long head of the biceps. This was tenosynovitic. It was tenodesed to the pectoralis and released proximal to this. The clavipectoral fascia was opened on the lateral edge of conjoined tendon and the retractor moved deep to this. The 3 sisters were identified and coagulated. An inferior window below the subscapularis was created. The inferior capsule was released directly off the humerus to allow greater than 90° of  external rotation. A large inferior osteophyte was present which was removed with rongeur. We then exposed the humeral head through the rotator interval, which was released to the glenohumeral joint. An intramedullary cut guide was placed. The anatomic neck was exposed and the humeral head osteotomy was performed in approximately 30° of retroversion. The remainder of the osteophytes were removed. The canal was then entered, reamed, and broached. The the trial stem was impacted in in approximately 30° of retroversion. A head protector was placed. The humerus was subluxated posteriorly. The glenoid exposed. Circumferential labral excision and capsular release were performed. A 270° mobilization of the subscapularis was carried out as well. The glenoid was noted to be almost completely denuded of cartilage. The remaining cartilage was removed with a Bee elevator. The central guide wire was placed. The glenoid was gently reamed. The peripheral and central holes were drilled and trialing was carried out. The appropriate size was chosen. A Raytec sponge was inserted into all drill holes to dry the holes of any blood. Once the cement was prepared, the raytec was removed. Cement was inserted into the 3 peripheral holes and pressurized twice prior to impaction of the pegged glenoid. Excellent fit was achieved with no rocking or instability. No excess cement was identified. The humerus was carefully subluxed back anteriorly. The appropriate head size and position were chosen and allowed approximately 50% posterior subluxation with spontaneous reduction, 25% inferior subluxation with spontaneous reduction, and full passive range of motion. The final stem was impacted in 30 degrees of retroversion. The neutral neck and final head were then impacted. The joint was copiously irrigated with normal saline solution mixed with 1 g of Rocephin after the final implants were assembled and in place. The shoulder was reduced. The rotator  interval was closed with 3 - #2 nonabsorbable sutures that were passed in a figure of 8 fashion followed by tying 4 alternating half hitch knots. The deltopectoral interval was approximated with 0 Vicryl, the subcutaneous tissue with 2-0 Vicryl, and the skin with an exofin dressing. Anesthesia was reversed and the patient was taken to the recovery room in stable condition. All instrument, needle, and sponge counts were correct.      The plan for Mr. Kee is to remain WBAT and ROM as tolerated to the operative extremity.      Brooks Juarez Jr., MD  04/30/24  10:56 EDT

## 2024-04-30 NOTE — PLAN OF CARE
Goal Outcome Evaluation:  Plan of Care Reviewed With: patient        Progress: no change  Outcome Evaluation: Pt amb 100' with SPC and CGAx2. Pt navigated two steps without difficulty. SPC issued for increased steadiness initially at home. No LOB noted. Recommend d/c home with assist when medically appropriate.      Anticipated Discharge Disposition (PT): home with assist

## 2024-04-30 NOTE — DISCHARGE INSTRUCTIONS
InfuBLOCK - Patient Information    What is a pain pump?  The InfuBLOCK pump delivers post-operative, non-narcotic, numbing medication to the nerve near the surgical site for pain relief.     Where can I find information about my pain pump?           For more information about your pain pump, scan the QR code.  For additional patient resources, visit [email or web address].                                                                                               While your physician is your primary source for information about your treatment there may be times during your treatment that you need assistance with your infusion pump.     If you need assistance take the following steps:    The [employer] Nursing Hotline is Here for You 24/7.  Please call [phone] for the following concerns or complications:    Answers to questions about your infusion pump                 Tubing disconnect  Assistance with pump alarms                                                      Dislodged catheter  Excessive leakage noted from pump                                         Inadequate pain control    2.   [facility] Anesthesia Acute Pain Service: [phone] is available 24/7 for any further needs or concerns about medication or pain control.     -------------------------------------------------------------------------    Nerve Catheter Removal Instructions  When your device is empty:    Remove your catheter by pulling the dressing off slowly (like you would remove a regular bandage). The catheter should pull right out of the skin.  Check that the BLUE tip is intact.                                                                                     If the catheter is stuck, reposition your   extremity and pull slowly until removed.  *If catheter is HURTING and WON'T come out, stop and call [phone] for further assistance.    Remove medication bag from the black carrying case.  Cut the  tubing on right and left side of pump, and discard the medication bag and tubing into garbage.  Place the pump and black carrying case into the plastic bag and then place this into the return box.  Seal box with blue stickers and return to [place] postal service. THIS IS PRE-PAID POSTAGE.        -------------------------------------------------------------------------    [facility] COLD THERAPY - PATIENT INSTRUCTION SHEET    Cold Compression Therapy for your comfort and rehabilitation  Your caregivers want you to be productive in your rehab and comfortable during your stay. In keeping with those goals, you will be receiving an SMI Cold Therapy Wrap to help ease post-operative pain and swelling that might keep you from getting back on track! Your SMI Cold Therapy Wrap is effective and simple-to-use, and you will be encouraged to apply it throughout your hospital stay and at home through the duration of your recovery.    When you are ready to go home  Be sure to take your SMI Cold Therapy Wrap and both sets of Gel Bags with you for continued comfort and use throughout your rehabilitation. If you don't already have them, ask your nurse or aide to retrieve your SMI Gel Bags from the patient freezer.    Home use precautions  Always follow your medical professional's application instructions upon discharge. Your SMI Cold Therapy Wrap and Gel Bags are designed to last for months following your surgery. Never heat the Gel Bags unless specified by your healthcare provider. Supervision is advised when using this product on children or geriatric patients. To avoid danger of suffocation, please keep the outer plastic packaging away from children & pets.    Cold Therapy Instructions  Place Gel Bags in a freezer set ¾ of the way to max temperature for at least (4) hours. For best results, lay the Gel Bags flat and [id number] in the freezer. Once frozen, slide Gel Bags into the gel pouch and secure your wrap to the affected area with the  straps.  Gel wraps that have been stored in a freezer for an extended period of time may require a (10) minute period of softening up in a room temperature environment before application.  The gel pouch acts as a protective barrier. NEVER place frozen bags directly onto skin, as this may cause frostbite injury.  The [facility] Cold Therapy Wrap is designed to be able to be worm while ambulating. The compression straps can be secured well enough so that the Wrap won't fall off while moving.  Wrap Application Videos can be viewed at [email or web address].  An additional protective barrier such as clothing, a washcloth, hand-towel or pillowcase may be used during prolonged treatment applications.  The Gel-Pouch and Wrap are both Latex-Free and the Gel Bag ingredients are non toxic.    [facility] Wrap care instructions  The [facility] Cold Therapy Wrap may be hand washed and hung to dry when needed.    [facility] re-order information  Additional [facility] body specific wraps and/or Gel Bags can be re-ordered from [email or web address] or call [employer] ([phone])

## 2024-04-30 NOTE — ANESTHESIA PREPROCEDURE EVALUATION
Anesthesia Evaluation     Patient summary reviewed and Nursing notes reviewed   NPO Solid Status: > 8 hours  NPO Liquid Status: > 2 hours           Airway   Mallampati: II  TM distance: >3 FB  Neck ROM: full  No difficulty expected  Dental          Pulmonary     breath sounds clear to auscultation  Cardiovascular     ECG reviewed  Rhythm: regular  Rate: normal    (+) hypertension, hyperlipidemia      Neuro/Psych  GI/Hepatic/Renal/Endo    (+) morbid obesity    Musculoskeletal     Abdominal    Substance History      OB/GYN          Other   arthritis,                   Anesthesia Plan    ASA 3     general with block     intravenous induction     Anesthetic plan, risks, benefits, and alternatives have been provided, discussed and informed consent has been obtained with: patient.    Plan discussed with CRNA.    CODE STATUS:

## 2024-04-30 NOTE — ANESTHESIA PROCEDURE NOTES
Airway  Urgency: elective    Date/Time: 4/30/2024 7:37 AM  Airway not difficult    General Information and Staff    Patient location during procedure: OR  CRNA/CAA: Holger Mcdaniels CRNA  SRNA: Khoa Marte SRNA  Indications and Patient Condition  Indications for airway management: airway protection    Preoxygenated: yes  MILS not maintained throughout  Mask difficulty assessment: 1 - vent by mask    Final Airway Details  Final airway type: endotracheal airway      Successful airway: ETT  Cuffed: yes   Successful intubation technique: video laryngoscopy  Endotracheal tube insertion site: oral  Blade: Nava  Blade size: 3  ETT size (mm): 7.5  Cormack-Lehane Classification: grade I - full view of glottis  Placement verified by: chest auscultation and capnometry   Measured from: lips  ETT/EBT  to lips (cm): 22  Number of attempts at approach: 1  Assessment: lips, teeth, and gum same as pre-op and atraumatic intubation    Additional Comments  Negative epigastric sounds, Breath sound equal bilaterally with symmetric chest rise and fall

## 2024-04-30 NOTE — THERAPY EVALUATION
Patient Name: Greg Kee  : 1970    MRN: [id number]                              Today's Date: 2024       Admit Date: 2024    Visit Dx:     ICD-10-CM ICD-9-CM   1. Primary osteoarthritis of right shoulder  M19.011 715.11     Patient Active Problem List   Diagnosis    Primary hypertension    Degenerative disc disease at L5-S1 level    Rotator cuff arthropathy of both shoulders    Other specified hypothyroidism    Intertrigo    Class 3 severe obesity due to excess calories with serious comorbidity and body mass index (BMI) of 40.0 to 44.9 in adult    Pure hypercholesterolemia    Spinal stenosis of lumbar region with neurogenic claudication    Encounter for circumcision    Degenerative joint disease of shoulder region    Financial insecurity    Seborrheic dermatitis    Pleuritic chest pain    Nose septum deviation    Insulin resistance     Past Medical History:   Diagnosis Date    DDD (degenerative disc disease), lumbar     Hearing loss     Hyperlipidemia     Hypertension      Past Surgical History:   Procedure Laterality Date    CIRCUMCISION  2023      General Information       Row Name 24 1607          OT Time and Intention    Document Type evaluation  -AR     Mode of Treatment individual therapy;occupational therapy  -AR       Row Name 24 1601          General Information    Patient Profile Reviewed yes  -AR     Prior Level of Function independent:;all household mobility;community mobility;gait;transfer;min assist:;ADL's  -AR     Existing Precautions/Restrictions fall;other (see comments)  simple sling, right interscalene nerve catheter, RUE WBAT/ROM as tolerated  -AR     Barriers to Rehab none identified  -AR       Row Name 24 1607          Living Environment    People in Home child(aletha), adult;spouse;grandchild(aletha)  -AR       Row Name 24 1607          Home Main Entrance    Number of Stairs, Main Entrance two  -AR     Stair Railings, Main Entrance railings  on both sides of stairs  -AR       Row Name 04/30/24 1607          Stairs Within Home, Primary    Number of Stairs, Within Home, Primary none  -AR       Row Name 04/30/24 1607          Cognition    Orientation Status (Cognition) oriented x 4  -AR       Row Name 04/30/24 1607          Safety Issues, Functional Mobility    Safety Issues Affecting Function (Mobility) judgment;problem-solving  -AR     Impairments Affecting Function (Mobility) balance;endurance/activity tolerance;motor control;pain;range of motion (ROM);sensation/sensory awareness;strength  -AR               User Key  (r) = Recorded By, (t) = Taken By, (c) = Cosigned By      Initials Name Provider Type    Aishwarya Gaspar OT Occupational Therapist                     Mobility/ADL's       Row Name 04/30/24 1609          Bed Mobility    Bed Mobility scooting/bridging;supine-sit;sit-supine  -AR     Scooting/Bridging [place] (Bed Mobility) contact guard  -AR     Supine-Sit [place] (Bed Mobility) minimum assist (75% patient effort);verbal cues  -AR     Sit-Supine [place] (Bed Mobility) minimum assist (75% patient effort);verbal cues  -AR     Assistive Device (Bed Mobility) head of bed elevated  -AR     Comment, (Bed Mobility) Educated pt hemay sleep in bed or recliner  -AR       Row Name 04/30/24 1609          Transfers    Transfers sit-stand transfer;stand-sit transfer;toilet transfer  -AR       Row Name 04/30/24 1609          Sit-Stand Transfer    Sit-Stand [place] (Transfers) verbal cues;minimum assist (75% patient effort)  -AR     Assistive Device (Sit-Stand Transfers) other (see comments)  WILFRIDO BARBAA  -AR       Row Name 04/30/24 1609          Stand-Sit Transfer    Stand-Sit [place] (Transfers) verbal cues;minimum assist (75% patient effort)  -AR     Assistive Device (Stand-Sit Transfers) other (see comments)  -AR       Row Name 04/30/24 1609          Toilet Transfer    Type (Toilet Transfer) sit-stand;stand-sit  -AR      [place] Level (Toilet Transfer) contact guard;verbal cues  -AR     Assistive Device (Toilet Transfer) grab bars/safety frame  -AR       Row Name 04/30/24 1609          Functional Mobility    Functional Mobility- Ind. Level minimum assist (75% patient effort);2 person assist required  -AR     Functional Mobility- Device other (see comments)  WILFRIDO HHA  -AR     Functional Mobility- Comment Pt did not pass mobility screen, recommend PT eval. No issues with dyspnea or desaturation on RA  -AR     Patient was able to Ambulate yes  -AR       Row Name 04/30/24 1609          Activities of Daily Living    BADL Assessment/Intervention bathing;upper body dressing;feeding;toileting;grooming  -AR       Row Name 04/30/24 1609          Mobility    Extremity Weight-bearing Status right upper extremity  -AR     Right Upper Extremity (Weight-bearing Status) weight-bearing as tolerated (WBAT)  -AR       Row Name 04/30/24 1609          Bathing Assessment/Intervention    Comment, (Bathing) Educated pt on L axilla care via pendulum technique as needed. Reviewed that he cannot shower until block has been DC  -AR       Row Name 04/30/24 1609          Upper Body Dressing Assessment/Training    [place] Level (Upper Body Dressing) doff;don;dependent (less than 25% patient effort);pajama/robe;front opening garment  sling  -AR     Position (Upper Body Dressing) edge of bed sitting  -AR     Comment, (Upper Body Dressing) Educated pt that RUE is WBAT/ROM as tolerated. Educated on ADL retraining as needed for comfort and to manage nerve catheter. Educated him on weaning from sling as motor control improves and pain allows to allow functional use of RUE. Family not in hospital and family has clothing.  -AR       Row Name 04/30/24 1609          Self-Feeding Assessment/Training    [place] Level (Feeding) liquids to mouth;supervision  -AR     Position (Self-Feeding) edge of bed sitting  -AR       Row Name 04/30/24 1609          Toileting  Assessment/Training    [place] Level (Toileting) toileting skills;contact guard assist  -AR     Position (Toileting) supported sitting  -AR       Row Name 04/30/24 1609          Grooming Assessment/Training    Comment, (Grooming) Showed pt location of nerve catheter in bathroom and educated on importance of attending to it with mobility and ADL to avoid accidental dislodgement.  -AR               User Key  (r) = Recorded By, (t) = Taken By, (c) = Cosigned By      Initials Name Provider Type    Aishwarya Gaspar, OT Occupational Therapist                   Obj/Interventions       Row Name 04/30/24 1614          Sensory Assessment (Somatosensory)    Sensory Assessment (Somatosensory) right UE  -AR     Sensory Subjective Reports numbness  -AR       Row Name 04/30/24 1614          Vision Assessment/Intervention    Visual Impairment/Limitations WNL  -AR       Row Name 04/30/24 1614          Range of Motion Comprehensive    Comment, General Range of Motion LUE WNL, R elbow 0-110, wrist/FA/hand WNL  -AR       Row Name 04/30/24 1614          Strength Comprehensive (MMT)    Comment, General Manual Muscle Testing (MMT) Assessment LUE WNL, RUE deferred  -AR       Row Name 04/30/24 1614          Shoulder (Therapeutic Exercise)    Shoulder (Therapeutic Exercise) AROM (active range of motion);PROM (passive range of motion)  -AR     Shoulder AROM (Therapeutic Exercise) bilateral;scapular retraction;sitting;10 repetitions  -AR     Shoulder PROM (Therapeutic Exercise) right;flexion;extension;aBduction;aDduction;external rotation;internal rotation;sitting;10 repetitions  Issued pulleys for home use once RUE motor function is adequate and he verbalized understanding  -AR       Row Name 04/30/24 1614          Elbow/Forearm (Therapeutic Exercise)    Elbow/Forearm (Therapeutic Exercise) AAROM (active assistive range of motion)  -AR     Elbow/Forearm AAROM (Therapeutic Exercise)  right;flexion;extension;supination;pronation;sitting;10 repetitions  -AR       Row Name 04/30/24 1614          Wrist (Therapeutic Exercise)    Wrist (Therapeutic Exercise) AROM (active range of motion)  -AR     Wrist AROM (Therapeutic Exercise) right;flexion;extension;10 repetitions  -AR       Row Name 04/30/24 1614          Hand (Therapeutic Exercise)    Hand (Therapeutic Exercise) AROM (active range of motion)  -AR     Hand AROM/AAROM (Therapeutic Exercise) right;AROM (active range of motion);finger extension;finger flexion;10 repetitions  -AR       Row Name 04/30/24 1614          Motor Skills    Therapeutic Exercise shoulder;elbow/forearm;wrist;hand  Issued and reviewed written RUE HEP  -AR       Row Name 04/30/24 1614          Balance    Balance Assessment sitting static balance;sitting dynamic balance;standing static balance;standing dynamic balance  -AR     Static Sitting Balance supervision  -AR     Dynamic Sitting Balance contact guard  -AR     Position, Sitting Balance supported;sitting edge of bed  -AR     Static Standing Balance contact guard  -AR     Dynamic Standing Balance minimal assist;2-person assist  -AR     Position/Device Used, Standing Balance supported  -AR               User Key  (r) = Recorded By, (t) = Taken By, (c) = Cosigned By      Initials Name Provider Type    Aishwarya Gaspar, OT Occupational Therapist                   Goals/Plan       Row Name 04/30/24 1644          Dressing Goal 1 (OT)    Activity/Device (Dressing Goal 1, OT) other (see comments)  Pt will verbalize/demo understanding of care of nerve catheter with dressing and use of sling as needed  -AR     Time Frame (Dressing Goal 1, OT) long term goal (LTG);by discharge  -AR     Progress/Outcome (Dressing Goal 1, OT) goal met  -AR       Row Name 04/30/24 1644          ROM Goal 1 (OT)    ROM Goal 1 (OT) Pt will verbalize/demo understanding of RUE HEP and use of pulleys as needed  -AR     Time Frame (ROM Goal 1, OT) long  term goal (LTG);by discharge  -AR     Progress/Outcome (ROM Goal 1, OT) goal met  -AR       Row Name 04/30/24 1644          Therapy Assessment/Plan (OT)    Planned Therapy Interventions (OT) BADL retraining;edema control/reduction;functional balance retraining;IADL retraining;occupation/activity based interventions;orthotic fabrication/fitting/training;passive ROM/stretching;patient/caregiver education/training;ROM/therapeutic exercise;transfer/mobility retraining  -AR               User Key  (r) = Recorded By, (t) = Taken By, (c) = Cosigned By      Initials Name Provider Type    AR Aishwarya Davis, AVERY Occupational Therapist                   Clinical Impression       Row Name 04/30/24 1617          Pain Assessment    Pain Intervention(s) Medication (See MAR);Repositioned;Ambulation/increased activity  -AR     Additional Documentation Pain Scale: FACES Pre/Post-Treatment (Group)  -AR       Row Name 04/30/24 1617          Pain Scale: FACES Pre/Post-Treatment    Pain: FACES Scale, Pretreatment 4-->hurts little more  -AR     Posttreatment Pain Rating 2-->hurts little bit  -AR     Pain Location - Side/Orientation Right  -AR     Pain Location generalized  -AR     Pain Location - shoulder  -AR       Row Name 04/30/24 1617          Plan of Care Review    Plan of Care Reviewed With patient  -AR     Outcome Evaluation OT educated pt on ROM/WBAT RUE and issued/reviewed written RUE HEP. Pt tolerated R shoulder PROM as follows: , IR chest/ER 45, extension 15, ABD 95. OT issued pulleys and educated on use. Educated on weaning from sling as motor control improves and pain allows. Pt ambulated 200 feet and did not pass mobility screen, recommend PT eval. Recommend DC home with initial 24/7 family assist. Pt reports his sons will be helping with all mobility at home.  -AR       Row Name 04/30/24 1617          Therapy Assessment/Plan (OT)    Rehab Potential (OT) good, to achieve stated therapy goals  -AR     Criteria for  Skilled Therapeutic Interventions Met (OT) yes  -AR     Therapy Frequency (OT) daily  -AR       Row Name 04/30/24 1617          Therapy Plan Review/Discharge Plan (OT)    Anticipated Discharge Disposition (OT) home with 24/7 care  -AR       Row Name 04/30/24 1617          Vital Signs    Pre SpO2 (%) 93  -AR     O2 Delivery Pre Treatment room air  -AR     Intra SpO2 (%) 90  -AR     O2 Delivery Intra Treatment room air  -AR     Post SpO2 (%) 92  -AR     O2 Delivery Post Treatment room air  -AR     Pre Patient Position Supine  -AR     Intra Patient Position Standing  -AR     Post Patient Position Sitting  -AR       Row Name 04/30/24 1617          Positioning and Restraints    Pre-Treatment Position in bed  -AR     Post Treatment Position bed  -AR     In Bed notified nsg;with PT;with brace  -AR               User Key  (r) = Recorded By, (t) = Taken By, (c) = Cosigned By      Initials Name Provider Type    Aishwarya Gaspar OT Occupational Therapist                   Outcome Measures       Row Name 04/30/24 1646          How much help from another is currently needed...    Putting on and taking off regular lower body clothing? 2  -AR     Bathing (including washing, rinsing, and drying) 2  -AR     Toileting (which includes using toilet bed pan or urinal) 3  -AR     Putting on and taking off regular upper body clothing 2  -AR     Taking care of personal grooming (such as brushing teeth) 3  -AR     Eating meals 3  -AR     AM-PAC 6 Clicks Score (OT) 15  -AR       Row Name 04/30/24 1646          Functional Assessment    Outcome Measure Options AM-PAC 6 Clicks Daily Activity (OT)  -AR               User Key  (r) = Recorded By, (t) = Taken By, (c) = Cosigned By      Initials Name Provider Type    Aishwarya Gaspar OT Occupational Therapist                    Occupational Therapy Education       Title: PT OT SLP Therapies (Done)       Topic: Occupational Therapy (Done)       Point: ADL training (Done)        Description:   Instruct learner(s) on proper safety adaptation and remediation techniques during self care or transfers.   Instruct in proper use of assistive devices.                  Learning Progress Summary             Patient EDYTA Brooks,TB,D,H, VU,DU by AR at 4/30/2024 1646                         Point: Home exercise program (Done)       Description:   Instruct learner(s) on appropriate technique for monitoring, assisting and/or progressing therapeutic exercises/activities.                  Learning Progress Summary             Patient EDYTA Brooks,TB,D,H, VU,DU by AR at 4/30/2024 1646                         Point: Precautions (Done)       Description:   Instruct learner(s) on prescribed precautions during self-care and functional transfers.                  Learning Progress Summary             Patient Cecilia, E,TB,D,H, VU,DU by AR at 4/30/2024 1646                         Point: Body mechanics (Done)       Description:   Instruct learner(s) on proper positioning and spine alignment during self-care, functional mobility activities and/or exercises.                  Learning Progress Summary             Patient EDYTA Brooks,TB,D,H, VU,DU by AR at 4/30/2024 1646                                         User Key       Initials Effective Dates Name Provider Type Discipline    AR 07/11/23 -  Aishwarya Davis, OT Occupational Therapist OT                  OT Recommendation and Plan  Planned Therapy Interventions (OT): BADL retraining, edema control/reduction, functional balance retraining, IADL retraining, occupation/activity based interventions, orthotic fabrication/fitting/training, passive ROM/stretching, patient/caregiver education/training, ROM/therapeutic exercise, transfer/mobility retraining  Therapy Frequency (OT): daily  Plan of Care Review  Plan of Care Reviewed With: patient  Outcome Evaluation: OT educated pt on ROM/WBAT RUE and issued/reviewed written RUE HEP. Pt tolerated R shoulder PROM as follows: ,  IR chest/ER 45, extension 15, ABD 95. OT issued pulleys and educated on use. Educated on weaning from sling as motor control improves and pain allows. Pt ambulated 200 feet and did not pass mobility screen, recommend PT eval. Recommend DC home with initial 24/7 family assist. Pt reports his sons will be helping with all mobility at home.     Time Calculation:   Evaluation Complexity (OT)  Review Occupational Profile/Medical/Therapy History Complexity: brief/low complexity  Assessment, Occupational Performance/Identification of Deficit Complexity: 1-3 performance deficits  Clinical Decision Making Complexity (OT): problem focused assessment/low complexity  Overall Complexity of Evaluation (OT): low complexity     Time Calculation- OT       Row Name 04/30/24 1647             Time Calculation- OT    OT Start Time 1310  -AR      OT Received On 04/30/24  -AR      OT Goal Re-Cert Due Date 05/10/24  -AR         Timed Charges    60003 - OT Therapeutic Exercise Minutes 20  -AR      19411 - OT Therapeutic Activity Minutes 12  -AR      80603 - OT Self Care/Mgmt Minutes 39  -AR         Untimed Charges    OT Eval/Re-eval Minutes 56  -AR         Total Minutes    Timed Charges Total Minutes 71  -AR      Untimed Charges Total Minutes 56  -AR       Total Minutes 127  -AR                User Key  (r) = Recorded By, (t) = Taken By, (c) = Cosigned By      Initials Name Provider Type    AR Aishwarya Davis OT Occupational Therapist                  Therapy Charges for Today       Code Description Service Date Service Provider Modifiers Qty    63686991846 HC OT SELF CARE/MGMT/TRAIN EA 15 MIN 4/30/2024 Aishwarya Davis OT GO 3    54574242313 HC OT THERAPEUTIC ACT EA 15 MIN 4/30/2024 Aishwarya Davis OT GO 1    08101963861 HC OT THER PROC EA 15 MIN 4/30/2024 Aishwarya Davis OT GO 1    34836281788 HC OT EVAL LOW COMPLEXITY 4 4/30/2024 Aishwarya Davis OT GO 1                 Aishwarya Davis OT  4/30/2024

## 2024-04-30 NOTE — ANESTHESIA PROCEDURE NOTES
Peripheral Block    Pre-sedation assessment completed: 7/15/2024 9:31 AM    Patient reassessed immediately prior to procedure    Patient location during procedure: OR  Start time: 4/30/2024 7:15 AM  Stop time: 4/30/2024 7:18 AM  Reason for block: at surgeon's request and post-op pain management  Performed by  CRNA/CAA: John Westfall CRNA  Assisted by: Ubaldo Maravilla CRNA  Preanesthetic Checklist  Completed: patient identified, IV checked, site marked, risks and benefits discussed, surgical consent, monitors and equipment checked, pre-op evaluation and timeout performed  Prep:  Pt Position: left lateral decubitus  Sterile barriers:cap, gloves, mask and washed/disinfected hands  Prep: ChloraPrep  Patient monitoring: blood pressure monitoring, continuous pulse oximetry and EKG  Procedure  Performed under: general  Guidance:ultrasound guided and landmark technique  Images:still images obtained, printed/placed on chart    Laterality:right  Block Type:PECS I and PECS II  Injection Technique:single-shot  Needle Type:short-bevel  Needle Gauge:20 G  Resistance on Injection: none    Medications Used: bupivacaine PF (MARCAINE) 0.25 % injection - Injection   30 mL - 4/30/2024 7:18:00 AM  dexamethasone sodium phosphate injection - Injection   2 mg - 4/30/2024 7:18:00 AM      Medications  Preservative Free Saline:5ml    Post Assessment  Injection Assessment: negative aspiration for heme, incremental injection and no paresthesia on injection  Patient Tolerance:comfortable throughout block  Complications:no  Additional Notes  Interpectoral-Pectoserratus Plane   A high-frequency linear transducer, with sterile cover, was placed medial to the coracoid process in the paramedian sagittal plane. The transducer was moved caudally to the 4th rib and rotated slightly to allow an in-plane needle trajectory from medial to lateral. Pectoralis Major Muscle (PMM), Pectoralis Minor Muscle (PmM), Thoracoacromial Artery, Ribs, and Pleura  "were identified under ultrasound. The insertion site was prepped in sterile fashion and then localized with 2-5 ml of 1% Lidocaine. Using ultrasound-guidance, a 20-gauge B-Mitchell 4\" Ultraplex 360 non-stimulating echogenic needle was advanced in plane until the tip of the needle was in the fascial plane between the PMM and PmM, lateral to the Thoracoacromial Artery. 1-3ml of preservative free normal saline was used to hydro-dissect the fascial planes. After the fascial plane was verified, 10ml local anesthetic (LA) was injected for Interpectoral fascial plane block. The needle was continued along the same path to the level of the 4th rib below PmM.  Initially preservative free normal saline was used to confirm needle position and then 20 ml of LA was injected for Pectoserratus fascial plane block. Aspiration every 5 ml to prevent intravascular injection. Injection was completed with negative aspiration of blood and negative intravascular injection. Injection pressures were normal with minimal resistance.               "

## 2024-04-30 NOTE — THERAPY EVALUATION
Patient Name: Greg Kee  : 1970    MRN: [id number]                              Today's Date: 2024       Admit Date: 2024    Visit Dx:     ICD-10-CM ICD-9-CM   1. Primary osteoarthritis of right shoulder  M19.011 715.11     Patient Active Problem List   Diagnosis    Primary hypertension    Degenerative disc disease at L5-S1 level    Rotator cuff arthropathy of both shoulders    Other specified hypothyroidism    Intertrigo    Class 3 severe obesity due to excess calories with serious comorbidity and body mass index (BMI) of 40.0 to 44.9 in adult    Pure hypercholesterolemia    Spinal stenosis of lumbar region with neurogenic claudication    Encounter for circumcision    Degenerative joint disease of shoulder region    Financial insecurity    Seborrheic dermatitis    Pleuritic chest pain    Nose septum deviation    Insulin resistance     Past Medical History:   Diagnosis Date    DDD (degenerative disc disease), lumbar     Hearing loss     Hyperlipidemia     Hypertension      Past Surgical History:   Procedure Laterality Date    CIRCUMCISION  2023      General Information       Row Name 24 1531          Physical Therapy Time and Intention    Document Type evaluation  -     Mode of Treatment physical therapy  -       Row Name 24 1531          General Information    Patient Profile Reviewed yes  -HW     Prior Level of Function independent:;all household mobility;community mobility;gait;transfer;min assist:;ADL's  -HW     Existing Precautions/Restrictions fall;other (see comments)  simple sling, R interscalene nerve catheter, ROM/WBAT RUE  -     Barriers to Rehab none identified  -       Row Name 24 1531          Living Environment    People in Home child(aletha), adult;grandchild(aletha);spouse  -       Row Name 24 1531          Home Main Entrance    Number of Stairs, Main Entrance two  -HW     Stair Railings, Main Entrance railings on both sides of stairs   -       Row Name 04/30/24 1531          Stairs Within Home, Primary    Number of Stairs, Within Home, Primary none  -       Row Name 04/30/24 1531          Cognition    Orientation Status (Cognition) oriented x 4  -HW       Row Name 04/30/24 1531          Safety Issues, Functional Mobility    Safety Issues Affecting Function (Mobility) insight into deficits/self-awareness;awareness of need for assistance;safety precaution awareness  -     Impairments Affecting Function (Mobility) balance;endurance/activity tolerance;motor control;pain;range of motion (ROM);sensation/sensory awareness;strength  -               User Key  (r) = Recorded By, (t) = Taken By, (c) = Cosigned By      Initials Name Provider Type     Yudelka Casas PT Physical Therapist                   Mobility       Row Name 04/30/24 1905          Bed Mobility    Bed Mobility sit-supine  -     Sit-Supine Sagle (Bed Mobility) standby assist  -     Assistive Device (Bed Mobility) head of bed elevated  -       Row Name 04/30/24 1905          Sit-Stand Transfer    Sit-Stand Sagle (Transfers) contact guard  -     Assistive Device (Sit-Stand Transfers) cane, straight  -       Row Name 04/30/24 1905          Gait/Stairs (Locomotion)    Sagle Level (Gait) contact guard;nonverbal cues (demo/gesture);verbal cues;2 person assist  -     Assistive Device (Gait) cane, straight  -HW     Distance in Feet (Gait) 100  -HW     Deviations/Abnormal Patterns (Gait) bilateral deviations;base of support, wide;weight shifting decreased;stride length decreased;gait speed decreased  -     Bilateral Gait Deviations forward flexed posture;heel strike decreased  -     Sagle Level (Stairs) contact guard;nonverbal cues (demo/gesture);verbal cues;2 person assist  -     Assistive Device (Stairs) cane, straight  -HW     Number of Steps (Stairs) 2  -HW     Ascending Technique (Stairs) step-to-step  -HW     Descending Technique (Stairs)  step-to-step  -     Comment, (Gait/Stairs) Ptamb with step-through gait pattern. VC for sequencing with cane with good recall. No LOB noted. Pt reported seeing spots in vision with ambulation. BP stable and symptoms resolved when returned to sitting. Pt navigated a step twice with good balance and sequencing. Activity limited by fatigue. Pt encouraged to limit distance ambulated tonight especially when symptomatic. Pt verbalized understanding.  -       Row Name 04/30/24 1905          Mobility    Extremity Weight-bearing Status right upper extremity  -     Right Upper Extremity (Weight-bearing Status) weight-bearing as tolerated (WBAT)  -               User Key  (r) = Recorded By, (t) = Taken By, (c) = Cosigned By      Initials Name Provider Type     Yudelka Casas, RODOLFO Physical Therapist                   Obj/Interventions       Row Name 04/30/24 1908          Range of Motion Comprehensive    General Range of Motion bilateral lower extremity ROM WFL  -[facility] Name 04/30/24 1908          Strength Comprehensive (MMT)    Comment, General Manual Muscle Testing (MMT) Assessment BLE L  -       Row Name 04/30/24 1908          Balance    Balance Assessment sitting static balance;sitting dynamic balance;sit to stand dynamic balance;standing dynamic balance;standing static balance  -     Static Sitting Balance standby assist  -     Dynamic Sitting Balance standby assist  -     Position, Sitting Balance sitting edge of bed  -     Static Standing Balance contact guard  -     Dynamic Standing Balance contact guard  -     Position/Device Used, Standing Balance supported;cane, straight  -     Balance Interventions sitting;standing;sit to stand;occupation based/functional task  -       Row Name 04/30/24 1908          Sensory Assessment (Somatosensory)    Sensory Assessment (Somatosensory) LE sensation intact  -               User Key  (r) = Recorded By, (t) = Taken By, (c) = Cosigned By       Initials Name Provider Type     Yudelka Casas PT Physical Therapist                   Goals/Plan    No documentation.                  Clinical Impression       Row Name 04/30/24 1908          Pain Scale: FACES Pre/Post-Treatment    Pain: FACES Scale, Pretreatment 2-->hurts little bit  -HW     Posttreatment Pain Rating 2-->hurts little bit  -HW     Pain Location - Side/Orientation Right  -     Pain Location generalized  -     Pain Location - shoulder  -HW       Row Name 04/30/24 1908          Plan of Care Review    Plan of Care Reviewed With patient  -     Progress no change  -     Outcome Evaluation Pt amb 100' with SPC and CGAx2. Pt navigated two steps without difficulty. SPC issued for increased steadiness initially at home. No LOB noted. Recommend d/c home with assist when medically appropriate.  -       Row Name 04/30/24 1908          Therapy Assessment/Plan (PT)    Rehab Potential (PT) good, to achieve stated therapy goals  -     Criteria for Skilled Interventions Met (PT) yes;meets criteria;skilled treatment is necessary  -     Therapy Frequency (PT) daily  -       Row Name 04/30/24 1908          Vital Signs    Pre Patient Position Sitting  -     Intra Patient Position Standing  -HW     Post Patient Position Supine  -HW       [facility] Name 04/30/24 1908          Positioning and Restraints    Pre-Treatment Position in bed  -HW     Post Treatment Position bed  -HW     In Bed notified nsg;encouraged to call for assist;patient within staff view;with OT;with nsg;fowlers;side rails up x2  in PACU with nsg  -               User Key  (r) = Recorded By, (t) = Taken By, (c) = Cosigned By      Initials Name Provider Type     Yudelka Casas PT Physical Therapist                   Outcome Measures       Row Name 04/30/24 1909          How much help from another person do you currently need...    Turning from your back to your side while in flat bed without using bedrails? 3  -HW     Moving from lying on  back to sitting on the side of a flat bed without bedrails? 3  -HW     Moving to and from a bed to a chair (including a wheelchair)? 4  -HW     Standing up from a chair using your arms (e.g., wheelchair, bedside chair)? 4  -HW     Climbing 3-5 steps with a railing? 4  -HW     To walk in hospital room? 4  -HW     AM-PAC 6 Clicks Score (PT) 22  -HW     Highest Level of Mobility Goal 7 --> Walk 25 feet or more  -       Row Name 04/30/24 1909 04/30/24 1708       Functional Assessment    Outcome Measure Options AM-PAC 6 Clicks Basic Mobility (PT)  - AM-PAC 6 Clicks Daily Activity (OT)  -AR      Row Name 04/30/24 1646          Functional Assessment    Outcome Measure Options AM-PAC 6 Clicks Daily Activity (OT)  -AR               User Key  (r) = Recorded By, (t) = Taken By, (c) = Cosigned By      Initials Name Provider Type    Aishwarya Gaspar, OT Occupational Therapist     Yudelka Casas, PT Physical Therapist                                 Physical Therapy Education       Title: PT OT SLP Therapies (Done)       Topic: Physical Therapy (Done)       Point: Mobility training (Done)       Learning Progress Summary             Patient Acceptance, E,D, VU,DU by  at 4/30/2024 1910                         Point: Home exercise program (Done)       Learning Progress Summary             Patient Acceptance, E,D, VU,DU by  at 4/30/2024 1910                         Point: Body mechanics (Done)       Learning Progress Summary             Patient Acceptance, E,D, VU,DU by  at 4/30/2024 1910                         Point: Precautions (Done)       Learning Progress Summary             Patient Acceptance, E,D, VU,DU by  at 4/30/2024 1910                                         User Key       Initials Effective Dates Name Provider Type Discipline     12/15/23 -  Yudelka Casas, PT Physical Therapist PT                  PT Recommendation and Plan     Plan of Care Reviewed With: patient  Progress: no change  Outcome  Evaluation: Pt amb 100' with SPC and CGAx2. Pt navigated two steps without difficulty. SPC issued for increased steadiness initially at home. No LOB noted. Recommend d/c home with assist when medically appropriate.     Time Calculation:   PT Evaluation Complexity  History, PT Evaluation Complexity: 1-2 personal factors and/or comorbidities  Examination of Body Systems (PT Eval Complexity): total of 3 or more elements  Clinical Presentation (PT Evaluation Complexity): stable  Clinical Decision Making (PT Evaluation Complexity): low complexity  Overall Complexity (PT Evaluation Complexity): low complexity     PT Charges       Row Name 04/30/24 1911             Time Calculation    Start Time 1448  -HW      PT Received On 04/30/24  -HW         Timed Charges    35650 - Gait Training Minutes  8  -HW         Untimed Charges    PT Eval/Re-eval Minutes 33  -HW         Total Minutes    Timed Charges Total Minutes 8  -HW      Untimed Charges Total Minutes 33  -HW       Total Minutes 41  -HW                User Key  (r) = Recorded By, (t) = Taken By, (c) = Cosigned By      Initials Name Provider Type     Yudelka Casas, PT Physical Therapist                  Therapy Charges for Today       Code Description Service Date Service Provider Modifiers Qty    09729855464 HC GAIT TRAINING EA 15 MIN 4/30/2024 Yudelka Casas, PT GP 1    [id number] HC PT EVAL LOW COMPLEXITY 3 4/30/2024 Yudelka Casas, PT GP 1            PT G-Codes  Outcome Measure Options: AM-PAC 6 Clicks Basic Mobility (PT)  AM-PAC 6 Clicks Score (PT): 22  AM-PAC 6 Clicks Score (OT): 15  PT Discharge Summary  Anticipated Discharge Disposition (PT): home with assist    Yudelka Casas PT  4/30/2024

## 2024-04-30 NOTE — H&P
Pre-Op H&P  Grge Kee  [id number]  1970      Chief complaint: Right shoulder pain      Subjective:  Patient is a 53 y.o.male presents for scheduled surgery by Dr. Juarez. He anticipates a TOTAL SHOULDER ARTHROPLASTY WITH BICEPS TENODESIS - RIGHT  today. He has a long history of shoulder pain with limited ROM. He has popping with elevation. PT made the pain worse.  He tried cortisone injections with short-term pain relief. He has difficulty sleeping due to the pain.      Review of Systems:  Constitutional-- No fever, chills or sweats. No fatigue.  CV-- No chest pain, palpitation or syncope. +HTN, HLD  Resp-- No SOB, cough, hemoptysis  Skin--No rashes or lesions      Allergies: No Known Allergies      Home Meds:  Medications Prior to Admission   Medication Sig Dispense Refill Last Dose    acetaminophen (Tylenol) 325 MG tablet PRN OTC   4/29/2024 at 1000    benzoyl peroxide 5 % external liquid Apply 3 times prior to surgery as directed by provider 148 mL 0 4/30/2024    clotrimazole (LOTRIMIN) 1 % cream Apply 1 Application topically to the appropriate area as directed 2 (Two) Times a Day. 60 g 3 4/29/2024 at 1000    gabapentin (NEURONTIN) 300 MG capsule Take 1 capsule by mouth 4 (Four) Times a Day.   4/29/2024 at 2200    ibuprofen (ADVIL,MOTRIN) 200 MG tablet PRN OTC   4/29/2024 at 1000    ketoconazole (NIZORAL) 2 % shampoo APPLY TOPICALLY TO APPROPRIATE AREA AS DIRECTED TWO (2) TIMES A WEEK 120 mL 1 4/30/2024 at 0330    NIFEdipine XL (PROCARDIA XL) 60 MG 24 hr tablet TAKE ONE (1) TABLET BY MOUTH EVERY DAY 30 tablet 1 4/30/2024 at 0330    olmesartan (BENICAR) 40 MG tablet Take 1 tablet by mouth Daily. 90 tablet 3 4/29/2024 at 1000    rosuvastatin (CRESTOR) 10 MG tablet TAKE ONE (1) TABLET BY MOUTH ONCE DAILY 90 tablet 2 4/30/2024 at 0330         PMH:   Past Medical History:   Diagnosis Date    DDD (degenerative disc disease), lumbar     Hearing loss     Hyperlipidemia     Hypertension      PSH:   "  Past Surgical History:   Procedure Laterality Date    CIRCUMCISION  09/2023     Social History:   Tobacco:   Social History     Tobacco Use   Smoking Status Never    Passive exposure: Never   Smokeless Tobacco Never      Alcohol:     Social History     Substance and Sexual Activity   Alcohol Use Never         Physical Exam:/87 (BP Location: Right arm, Patient Position: Lying)   Pulse 78   Temp 97.6 °F (36.4 °C) (Temporal)   Resp 18   Ht 175.3 cm (69.02\")   Wt (!) 150 kg (331 lb 7.4 oz)   SpO2 99%   BMI 48.93 kg/m²       General Appearance:    Alert, cooperative, no distress, appears stated age   Head:    Normocephalic, without obvious abnormality, atraumatic   Lungs:     Clear to auscultation bilaterally, respirations unlabored    Heart:   Regular rate and rhythm, S1 and S2 normal    Abdomen:    Soft without tenderness   Extremities:   Extremities normal, atraumatic, no cyanosis or edema   Skin:   Skin color, texture, turgor normal, no rashes or lesions   Neurologic:   Grossly intact     Results Review:     LABS:  Lab Results   Component Value Date    WBC 4.99 04/16/2024    HGB 14.0 04/16/2024    HCT 42.8 04/16/2024    MCV 91.8 04/16/2024     04/16/2024    GLUCOSE 140 (H) 04/16/2024    BUN 13 04/16/2024    CREATININE 0.84 04/16/2024     04/16/2024    K 4.1 04/30/2024     04/16/2024    CO2 25.0 04/16/2024    CALCIUM 9.1 04/16/2024    ALBUMIN 4.3 02/28/2024    AST 22 02/28/2024    ALT 23 02/28/2024    BILITOT 0.4 02/28/2024       RADIOLOGY:  Imaging Results (Last 72 Hours)       ** No results found for the last 72 hours. **            I reviewed the patient's new clinical results.    Cancer Staging (if applicable)  Cancer Patient: __ yes __no __unknown; If yes, clinical stage T:__ N:__M:__, stage group or __N/A      Impression: Primary osteoarthritis right shoulder       Plan: TOTAL SHOULDER ARTHROPLASTY WITH BICEPS TENODESIS - RIGHT       EDUARDA Morton   4/30/2024   07:03 " EDT

## 2024-04-30 NOTE — THERAPY TREATMENT NOTE
Patient Name: Greg Kee  : 1970    MRN: [id number]                              Today's Date: 2024       Admit Date: 2024    Visit Dx:     ICD-10-CM ICD-9-CM   1. Primary osteoarthritis of right shoulder  M19.011 715.11     Patient Active Problem List   Diagnosis    Primary hypertension    Degenerative disc disease at L5-S1 level    Rotator cuff arthropathy of both shoulders    Other specified hypothyroidism    Intertrigo    Class 3 severe obesity due to excess calories with serious comorbidity and body mass index (BMI) of 40.0 to 44.9 in adult    Pure hypercholesterolemia    Spinal stenosis of lumbar region with neurogenic claudication    Encounter for circumcision    Degenerative joint disease of shoulder region    Financial insecurity    Seborrheic dermatitis    Pleuritic chest pain    Nose septum deviation    Insulin resistance     Past Medical History:   Diagnosis Date    DDD (degenerative disc disease), lumbar     Hearing loss     Hyperlipidemia     Hypertension      Past Surgical History:   Procedure Laterality Date    CIRCUMCISION  2023      General Information       Row Name 24 1700 24 1600       OT Time and Intention    Document Type progress note/recertification  -AR evaluation  -AR    Mode of Treatment individual therapy;occupational therapy  -AR individual therapy;occupational therapy  -AR      Row Name 24 1700 24 1602       General Information    Patient Profile Reviewed -- yes  -AR    Prior Level of Function -- independent:;all household mobility;community mobility;gait;transfer;min assist:;ADL's  -AR    Existing Precautions/Restrictions fall;other (see comments)  simple sling, R interscalene nerve catheter, ROM/WBAT RUE  -AR fall;other (see comments)  simple sling, right interscalene nerve catheter, RUE WBAT/ROM as tolerated  -AR    Barriers to Rehab -- none identified  -AR      Row Name 24 1609          Living Environment    People in  Home child(aletha), adult;spouse;grandchild(aletha)  -AR       Row Name 04/30/24 1607          Home Main Entrance    Number of Stairs, Main Entrance two  -AR     Stair Railings, Main Entrance railings on both sides of stairs  -AR       Row Name 04/30/24 1607          Stairs Within Home, Primary    Number of Stairs, Within Home, Primary none  -AR       Row Name 04/30/24 1700 04/30/24 1607       Cognition    Orientation Status (Cognition) oriented x 4  -AR oriented x 4  -AR      Row Name 04/30/24 1607          Safety Issues, Functional Mobility    Safety Issues Affecting Function (Mobility) judgment;problem-solving  -AR     Impairments Affecting Function (Mobility) balance;endurance/activity tolerance;motor control;pain;range of motion (ROM);sensation/sensory awareness;strength  -AR               User Key  (r) = Recorded By, (t) = Taken By, (c) = Cosigned By      Initials Name Provider Type    Aishwarya Gaspar OT Occupational Therapist                     Mobility/ADL's       Row Name 04/30/24 1609          Bed Mobility    Bed Mobility scooting/bridging;supine-sit;sit-supine  -AR     Scooting/Bridging Finger (Bed Mobility) contact guard  -AR     Supine-Sit Finger (Bed Mobility) minimum assist (75% patient effort);verbal cues  -AR     Sit-Supine Finger (Bed Mobility) minimum assist (75% patient effort);verbal cues  -AR     Assistive Device (Bed Mobility) head of bed elevated  -AR     Comment, (Bed Mobility) Educated pt hemay sleep in bed or recliner  -AR       Row Name 04/30/24 1701 04/30/24 1609       Transfers    Transfers -- sit-stand transfer;stand-sit transfer;toilet transfer  -AR    Comment, (Transfers) Educated pt and family on recommended use of giat belt with all mobility.  -AR --      Row Name 04/30/24 1609          Sit-Stand Transfer    Sit-Stand Finger (Transfers) verbal cues;minimum assist (75% patient effort)  -AR     Assistive Device (Sit-Stand Transfers) other (see comments)   WILFRIDO HHA  -AR       Row Name 04/30/24 1609          Stand-Sit Transfer    Stand-Sit [place] (Transfers) verbal cues;minimum assist (75% patient effort)  -AR     Assistive Device (Stand-Sit Transfers) other (see comments)  -AR       Row Name 04/30/24 1609          Toilet Transfer    Type (Toilet Transfer) sit-stand;stand-sit  -AR     [place] Level (Toilet Transfer) contact guard;verbal cues  -AR     Assistive Device (Toilet Transfer) grab bars/safety frame  -AR       Row Name 04/30/24 1609          Functional Mobility    Functional Mobility- Ind. Level minimum assist (75% patient effort);2 person assist required  -AR     Functional Mobility- Device other (see comments)  MAIRAEDYTA HHA  -AR     Functional Mobility- Comment Pt did not pass mobility screen, recommend PT eval. No issues with dyspnea or desaturation on RA  -AR     Patient was able to Ambulate yes  -AR       Row Name 04/30/24 1701 04/30/24 1609       Activities of Daily Living    BADL Assessment/Intervention bathing;upper body dressing  -AR bathing;upper body dressing;feeding;toileting;grooming  -AR      Row Name 04/30/24 1701 04/30/24 1609       Mobility    Extremity Weight-bearing Status right upper extremity  -AR right upper extremity  -AR    Right Upper Extremity (Weight-bearing Status) weight-bearing as tolerated (WBAT)  -AR weight-bearing as tolerated (WBAT)  -AR      Row Name 04/30/24 1701 04/30/24 1609       Bathing Assessment/Intervention    Comment, (Bathing) Patient family on with axilla care via pendulum technique as needed for comfort. Educated that nerve catheter cannot get wet.  -AR Educated pt on L axilla care via pendulum technique as needed. Reviewed that he cannot shower until block has been DC  -AR      Row Name 04/30/24 1701 04/30/24 1609       Upper Body Dressing Assessment/Training    [place] Level (Upper Body Dressing) -- doff;don;dependent (less than 25% patient effort);pajama/robe;front opening garment  sling  -AR     Position (Upper Body Dressing) -- edge of bed sitting  -AR    Comment, (Upper Body Dressing) Patient on RUE ROM/WBAT and ADL retraining for comfort, weaning from sling as motor control and pain allow and management of nerve catheter. Patient and family delined having OT assist with dressing.  -AR Educated pt that RUE is WBAT/ROM as tolerated. Educated on ADL retraining as needed for comfort and to manage nerve catheter. Educated him on weaning from sling as motor control improves and pain allows to allow functional use of RUE. Family not in hospital and family has clothing.  -AR      Row Name 04/30/24 1609          Self-Feeding Assessment/Training    [place] Level (Feeding) liquids to mouth;supervision  -AR     Position (Self-Feeding) edge of bed sitting  -AR       Row Name 04/30/24 1609          Toileting Assessment/Training    [place] Level (Toileting) toileting skills;contact guard assist  -AR     Position (Toileting) supported sitting  -AR       Row Name 04/30/24 1609          Grooming Assessment/Training    Comment, (Grooming) Showed pt location of nerve catheter in bathroom and educated on importance of attending to it with mobility and ADL to avoid accidental dislodgement.  -AR               User Key  (r) = Recorded By, (t) = Taken By, (c) = Cosigned By      Initials Name Provider Type    Aishwarya Gaspar, OT Occupational Therapist                   Obj/Interventions       Row Name 04/30/24 1614          Sensory Assessment (Somatosensory)    Sensory Assessment (Somatosensory) right UE  -AR     Sensory Subjective Reports numbness  -AR       Row Name 04/30/24 1614          Vision Assessment/Intervention    Visual Impairment/Limitations WNL  -AR       Row Name 04/30/24 1614          Range of Motion Comprehensive    Comment, General Range of Motion LUE WNL, R elbow 0-110, wrist/FA/hand WNL  -AR       Row Name 04/30/24 1614          Strength Comprehensive (MMT)    Comment, General Manual Muscle  Testing (MMT) Assessment WILFRIDO WNL, RUEDYTA deferred  -AR       Row Name 04/30/24 1614          Shoulder (Therapeutic Exercise)    Shoulder (Therapeutic Exercise) AROM (active range of motion);PROM (passive range of motion)  -AR     Shoulder AROM (Therapeutic Exercise) bilateral;scapular retraction;sitting;10 repetitions  -AR     Shoulder PROM (Therapeutic Exercise) right;flexion;extension;aBduction;aDduction;external rotation;internal rotation;sitting;10 repetitions  Issued pulleys for home use once RUE motor function is adequate and he verbalized understanding  -AR       Row Name 04/30/24 1614          Elbow/Forearm (Therapeutic Exercise)    Elbow/Forearm (Therapeutic Exercise) AAROM (active assistive range of motion)  -AR     Elbow/Forearm AAROM (Therapeutic Exercise) right;flexion;extension;supination;pronation;sitting;10 repetitions  -AR       Row Name 04/30/24 1614          Wrist (Therapeutic Exercise)    Wrist (Therapeutic Exercise) AROM (active range of motion)  -AR     Wrist AROM (Therapeutic Exercise) right;flexion;extension;10 repetitions  -AR       Row Name 04/30/24 1614          Hand (Therapeutic Exercise)    Hand (Therapeutic Exercise) AROM (active range of motion)  -AR     Hand AROM/AAROM (Therapeutic Exercise) right;AROM (active range of motion);finger extension;finger flexion;10 repetitions  -AR       Row Name 04/30/24 1704 04/30/24 1614       Motor Skills    Therapeutic Exercise --  Reviewed written HEP with pt and family and use of pullys/AAROM as needed  -AR shoulder;elbow/forearm;wrist;hand  Issued and reviewed written RUE HEP  -AR      Row Name 04/30/24 1614          Balance    Balance Assessment sitting static balance;sitting dynamic balance;standing static balance;standing dynamic balance  -AR     Static Sitting Balance supervision  -AR     Dynamic Sitting Balance contact guard  -AR     Position, Sitting Balance supported;sitting edge of bed  -AR     Static Standing Balance contact guard  -AR      Dynamic Standing Balance minimal assist;2-person assist  -AR     Position/Device Used, Standing Balance supported  -AR               User Key  (r) = Recorded By, (t) = Taken By, (c) = Cosigned By      Initials Name Provider Type    Aishwarya Gaspar OT Occupational Therapist                   Goals/Plan       Row Name 04/30/24 1646          Dressing Goal 1 (OT)    Activity/Device (Dressing Goal 1, OT) other (see comments)  Pt will verbalize/demo understanding of care of nerve catheter with dressing and use of sling as needed  -AR     Time Frame (Dressing Goal 1, OT) long term goal (LTG);by discharge  -AR     Progress/Outcome (Dressing Goal 1, OT) goal met  -AR       Row Name 04/30/24 1644          ROM Goal 1 (OT)    ROM Goal 1 (OT) Pt will verbalize/demo understanding of RUE HEP and use of pulleys as needed  -AR     Time Frame (ROM Goal 1, OT) long term goal (LTG);by discharge  -AR     Progress/Outcome (ROM Goal 1, OT) goal met  -AR       Row Name 04/30/24 1644          Therapy Assessment/Plan (OT)    Planned Therapy Interventions (OT) BADL retraining;edema control/reduction;functional balance retraining;IADL retraining;occupation/activity based interventions;orthotic fabrication/fitting/training;passive ROM/stretching;patient/caregiver education/training;ROM/therapeutic exercise;transfer/mobility retraining  -AR               User Key  (r) = Recorded By, (t) = Taken By, (c) = Cosigned By      Initials Name Provider Type    Aishwarya Gaspar OT Occupational Therapist                   Clinical Impression       Row Name 04/30/24 1617          Pain Assessment    Pain Intervention(s) Medication (See MAR);Repositioned;Ambulation/increased activity  -AR     Additional Documentation Pain Scale: FACES Pre/Post-Treatment (Group)  -AR       Row Name 04/30/24 1705 04/30/24 1617       Pain Scale: FACES Pre/Post-Treatment    Pain: FACES Scale, Pretreatment 2-->hurts little bit  -AR 4-->hurts little more  -AR     Posttreatment Pain Rating 2-->hurts little bit  -AR 2-->hurts little bit  -AR    Pain Location - Side/Orientation Right  -AR Right  -AR    Pain Location generalized  -AR generalized  -AR    Pain Location - shoulder  -AR shoulder  -AR      Row Name 04/30/24 1705 04/30/24 1617       Plan of Care Review    Plan of Care Reviewed With patient;spouse;daughter  -AR patient  -AR    Outcome Evaluation OT received call from PACU RN that family was at bedside and had questions on sling and HEP. OT returned to PACU and educated family on RUE ROM/WBAT, weaning from sling as motor control and pain allow, care of nerve catheter with ADLS to avoid disldogement. OT reviewed written HEP with family and use of pulleys/AAROM as needed. They verablized understanding. OT recommended use of gait belt with all initial mobility and they verbalized understanding.  -AR OT educated pt on ROM/WBAT RUE and issued/reviewed written RUE HEP. Pt tolerated R shoulder PROM as follows: , IR chest/ER 45, extension 15, ABD 95. OT issued pulleys and educated on use. Educated on weaning from sling as motor control improves and pain allows. Pt ambulated 200 feet and did not pass mobility screen, recommend PT eval. Recommend DC home with initial 24/7 family assist. Pt reports his sons will be helping with all mobility at home.  -AR      Row Name 04/30/24 1617          Therapy Assessment/Plan (OT)    Rehab Potential (OT) good, to achieve stated therapy goals  -AR     Criteria for Skilled Therapeutic Interventions Met (OT) yes  -AR     Therapy Frequency (OT) daily  -AR       Row Name 04/30/24 1705 04/30/24 1617       Therapy Plan Review/Discharge Plan (OT)    Anticipated Discharge Disposition (OT) home with 24/7 care  -AR home with 24/7 care  -AR      Row Name 04/30/24 1705 04/30/24 1617       Vital Signs    Pre SpO2 (%) -- 93  -AR    O2 Delivery Pre Treatment -- room air  -AR    Intra SpO2 (%) -- 90  -AR    O2 Delivery Intra Treatment -- room air  -AR     Post SpO2 (%) -- 92  -AR    O2 Delivery Post Treatment -- room air  -AR    Pre Patient Position Supine  -AR Supine  -AR    Intra Patient Position Supine  -AR Standing  -AR    Post Patient Position Supine  -AR Sitting  -AR      Row Name 04/30/24 1705 04/30/24 1617       Positioning and Restraints    Pre-Treatment Position in bed  -AR in bed  -AR    Post Treatment Position bed  -AR bed  -AR    In Bed with family/caregiver;with nsg;with brace  -AR notified nsg;with PT;with brace  -AR              User Key  (r) = Recorded By, (t) = Taken By, (c) = Cosigned By      Initials Name Provider Type    Aishwarya Gaspar OT Occupational Therapist                   Outcome Measures       Row Name 04/30/24 1708 04/30/24 1646       How much help from another is currently needed...    Putting on and taking off regular lower body clothing? 2  -AR 2  -AR    Bathing (including washing, rinsing, and drying) 2  -AR 2  -AR    Toileting (which includes using toilet bed pan or urinal) 3  -AR 3  -AR    Putting on and taking off regular upper body clothing 2  -AR 2  -AR    Taking care of personal grooming (such as brushing teeth) 3  -AR 3  -AR    Eating meals 3  -AR 3  -AR    AM-PAC 6 Clicks Score (OT) 15  -AR 15  -AR      Row Name 04/30/24 1708 04/30/24 1646       Functional Assessment    Outcome Measure Options AM-PAC 6 Clicks Daily Activity (OT)  -AR AM-PAC 6 Clicks Daily Activity (OT)  -AR              User Key  (r) = Recorded By, (t) = Taken By, (c) = Cosigned By      Initials Name Provider Type    Aishwarya Gaspar OT Occupational Therapist                    Occupational Therapy Education       Title: PT OT SLP Therapies (Done)       Topic: Occupational Therapy (Done)       Point: ADL training (Done)       Description:   Instruct learner(s) on proper safety adaptation and remediation techniques during self care or transfers.   Instruct in proper use of assistive devices.                  Learning Progress Summary              Patient Eager, E,D,H, VU by AR at 4/30/2024 1708    Eager, E,TB,D,H, VU,DU by AR at 4/30/2024 1646   Family Eager, E,D,H, VU by AR at 4/30/2024 1708                         Point: Home exercise program (Done)       Description:   Instruct learner(s) on appropriate technique for monitoring, assisting and/or progressing therapeutic exercises/activities.                  Learning Progress Summary             Patient Eager, E,D,H, VU by AR at 4/30/2024 1708    Eager, E,TB,D,H, VU,DU by AR at 4/30/2024 1646   Family Eager, E,D,H, VU by AR at 4/30/2024 1708                         Point: Precautions (Done)       Description:   Instruct learner(s) on prescribed precautions during self-care and functional transfers.                  Learning Progress Summary             Patient Eager, E,D,H, VU by AR at 4/30/2024 1708    Eager, E,TB,D,H, VU,DU by AR at 4/30/2024 1646   Family Eager, E,D,H, VU by AR at 4/30/2024 1708                         Point: Body mechanics (Done)       Description:   Instruct learner(s) on proper positioning and spine alignment during self-care, functional mobility activities and/or exercises.                  Learning Progress Summary             Patient Eager, E,D,H, VU by AR at 4/30/2024 1708    Eager, E,TB,D,H, VU,DU by AR at 4/30/2024 1646   Family Eager, E,D,H, VU by AR at 4/30/2024 1708                                         User Key       Initials Effective Dates Name Provider Type Discipline    AR 07/11/23 -  Aishwarya Davis OT Occupational Therapist OT                  OT Recommendation and Plan  Planned Therapy Interventions (OT): BADL retraining, edema control/reduction, functional balance retraining, IADL retraining, occupation/activity based interventions, orthotic fabrication/fitting/training, passive ROM/stretching, patient/caregiver education/training, ROM/therapeutic exercise, transfer/mobility retraining  Therapy Frequency (OT): daily  Plan of Care Review  Plan of Care  Reviewed With: patient, spouse, daughter  Outcome Evaluation: OT received call from PACU RN that family was at bedside and had questions on sling and HEP. OT returned to PACU and educated family on RUE ROM/WBAT, weaning from sling as motor control and pain allow, care of nerve catheter with ADLS to avoid disldogement. OT reviewed written HEP with family and use of pulleys/AAROM as needed. They verablized understanding. OT recommended use of gait belt with all initial mobility and they verbalized understanding.     Time Calculation:   Evaluation Complexity (OT)  Review Occupational Profile/Medical/Therapy History Complexity: brief/low complexity  Assessment, Occupational Performance/Identification of Deficit Complexity: 1-3 performance deficits  Clinical Decision Making Complexity (OT): problem focused assessment/low complexity  Overall Complexity of Evaluation (OT): low complexity     Time Calculation- OT       Row Name 04/30/24 1708 04/30/24 1647          Time Calculation- OT    OT Start Time 1544  -AR 1310  -AR     OT Received On 04/30/24  -AR 04/30/24  -AR     OT Goal Re-Cert Due Date 05/10/24  -AR 05/10/24  -AR        Timed Charges    24496 - OT Therapeutic Exercise Minutes 13  -AR 20  -AR     43991 - OT Therapeutic Activity Minutes -- 12  -AR     31258 - OT Self Care/Mgmt Minutes 6  -AR 39  -AR        Untimed Charges    OT Eval/Re-eval Minutes -- 56  -AR        Total Minutes    Timed Charges Total Minutes 19  -AR 71  -AR     Untimed Charges Total Minutes -- 56  -AR      Total Minutes 19  -  -AR               User Key  (r) = Recorded By, (t) = Taken By, (c) = Cosigned By      Initials Name Provider Type    Aishwarya Gaspar OT Occupational Therapist                  Therapy Charges for Today       Code Description Service Date Service Provider Modifiers Qty    82494358214 HC OT SELF CARE/MGMT/TRAIN EA 15 MIN 4/30/2024 Aishwarya Davis OT GO 3    19492324805 HC OT THERAPEUTIC ACT EA 15 MIN  4/30/2024 Aishwarya Davis, OT GO 1    41874258666 HC OT THER PROC EA 15 MIN 4/30/2024 Aishwarya Davis OT GO 1    61674703411 HC OT EVAL LOW COMPLEXITY 4 4/30/2024 Aishwarya Davis OT GO 1    56879318314 HC OT THERAPEUTIC ACT EA 15 MIN 4/30/2024 Aishwarya Davis OT GO 1                 Aishwarya Davis OT  4/30/2024

## 2024-04-30 NOTE — PLAN OF CARE
Goal Outcome Evaluation:  Plan of Care Reviewed With: patient           Outcome Evaluation: OT educated pt on ROM/WBAT RUE and issued/reviewed written RUE HEP. Pt tolerated R shoulder PROM as follows: , IR chest/ER 45, extension 15, ABD 95. OT issued pulleys and educated on use. Educated on weaning from sling as motor control improves and pain allows. Pt ambulated 200 feet and did not pass mobility screen, recommend PT eval. Recommend DC home with initial 24/7 family assist. Pt reports his sons will be helping with all mobility at home.      Anticipated Discharge Disposition (OT): home with 24/7 care

## 2024-04-30 NOTE — PLAN OF CARE
Goal Outcome Evaluation:  Plan of Care Reviewed With: patient, spouse, daughter           Outcome Evaluation: OT received call from PACU RN that family was at bedside and had questions on sling and HEP. OT returned to PACU and educated family on RUE ROM/WBAT, weaning from sling as motor control and pain allow, care of nerve catheter with ADLS to avoid disldogement. OT reviewed written HEP with family and use of pulleys/AAROM as needed. They verablized understanding. OT recommended use of gait belt with all initial mobility and they verbalized understanding.      Anticipated Discharge Disposition (OT): home with 24/7 care

## 2024-04-30 NOTE — ANESTHESIA POSTPROCEDURE EVALUATION
Patient: Greg Kee    Procedure Summary       Date: 04/30/24 [facility] / [facility]    Anesthesia Start: 0731 Anesthesia Stop: 1104    Procedure: TOTAL SHOULDER ARTHROPLASTY WITH BICEPS TENODESIS - RIGHT (Right: Shoulder) Diagnosis:     Surgeons: Brooks Juarez Jr., MD Provider: Rachid Cobian MD    Anesthesia Type: general with block ASA Status: 3            Anesthesia Type: general with block    Vitals  No vitals data found for the desired time range.          Post Anesthesia Care and Evaluation    Patient location during evaluation: PACU  Patient participation: complete - patient participated  Level of consciousness: awake and alert  Pain score: 0  Pain management: adequate    Airway patency: patent  Anesthetic complications: No anesthetic complications  PONV Status: none  Cardiovascular status: hemodynamically stable and acceptable  Respiratory status: nonlabored ventilation, acceptable and nasal cannula  Hydration status: acceptable

## 2024-04-30 NOTE — ANESTHESIA PROCEDURE NOTES
Peripheral Block    Pre-sedation assessment completed: 4/30/2024 6:55 AM    Patient reassessed immediately prior to procedure    Patient location during procedure: pre-op  Start time: 4/30/2024 6:55 AM  Stop time: 4/30/2024 7:10 AM  Reason for block: at surgeon's request and post-op pain management  Performed by  CRNA/CAA: John Westfall CRNA  Assisted by: Ubaldo Maravilla CRNA  Preanesthetic Checklist  Completed: patient identified, IV checked, site marked, risks and benefits discussed, surgical consent, monitors and equipment checked, pre-op evaluation and timeout performed  Prep:  Pt Position: left lateral decubitus  Sterile barriers:cap, gloves, mask and washed/disinfected hands  Prep: ChloraPrep  Patient monitoring: blood pressure monitoring, continuous pulse oximetry and EKG  Procedure    Sedation: yes  Performed under: local infiltration  Guidance:ultrasound guided    ULTRASOUND INTERPRETATION.  Using ultrasound guidance a 20 G gauge needle was placed in close proximity to the nerve, at which point, under ultrasound guidance anesthetic was injected in the area of the nerve and spread of the anesthesia was seen on ultrasound in close proximity thereto.  There were no abnormalities seen on ultrasound; a digital image was taken; and the patient tolerated the procedure with no complications. Images:still images obtained, printed/placed on chart    Laterality:right  Block Type:interscalene  Injection Technique:catheter  Needle Type:Tuohy and echogenic  Needle Gauge:18 G  Resistance on Injection: none  Catheter Size:20 G (20g)  Cath Depth at skin: 8 cm    Medications Used: fentaNYL citrate (PF) (SUBLIMAZE) injection - Intravenous   100 mcg - 4/30/2024 7:10:00 AM  bupivacaine PF (MARCAINE) 0.25 % injection - Injection   10 mL - 4/30/2024 7:10:00 AM      Medications  Preservative Free Saline:5ml    Post Assessment  Injection Assessment: negative aspiration for heme, no paresthesia on injection and incremental  "injection  Patient Tolerance:comfortable throughout block  Complications:no  Additional Notes  CATHETER  A high-frequency linear transducer, with sterile cover, was placed in the supraclavicular fossa to identify the subclavian artery and trunks and divisions of the brachial plexus. The transducer was then moved in a cephalad orientation with a slight rotation to continue visualization of the brachial plexus from the trunks and divisions, on to the C5-C7 roots. The insertion site was prepped and draped in sterile fashion. Skin and cutaneous tissue was infiltrated with 2-5 ml of 1% Lidocaine. Using ultrasound-guidance, an 18-gauge Contiplex Ultra 360 Touhy needle was advanced in plane from lateral to medial. Preservative-free normal saline was utilized for hydro-dissection of tissue, advancement of Touhy, and to confirm final needle placement at the fascial plane between the middle scalene muscle and sheath of the brachial plexus (C5-C7). A 20-gauge Contiplex Echo catheter was placed through the needle and advance out the tip of the Touhy 3-5 cm with the \"Glover Flip\". The Touhy needle was then removed, and final catheter position verified lateral to the brachial plexus with local anesthetic (LA) and ultrasound visualization. The catheter was secured in the usual fashion with skin glue, benzoin, steri-strips, CHG tegaderm and label noting \"Nerve Block Catheter\". Jerk tape applied at yellow connector and catheter connection. All LA was injected in increments of 3-5 ml after catheter secured. Aspiration every 5 ml to prevent intravascular injection. Injection was completed with negative aspiration of blood and negative intravascular injection. Injection pressures were normal with minimal resistance.             "

## 2024-05-06 ENCOUNTER — TELEPHONE (OUTPATIENT)
Dept: INTERNAL MEDICINE | Facility: CLINIC | Age: 54
End: 2024-05-06
Payer: MEDICAID

## 2024-05-20 DIAGNOSIS — I10 PRIMARY HYPERTENSION: ICD-10-CM

## 2024-05-20 RX ORDER — NIFEDIPINE 60 MG/1
60 TABLET, EXTENDED RELEASE ORAL DAILY
Qty: 30 TABLET | Refills: 1 | Status: SHIPPED | OUTPATIENT
Start: 2024-05-20

## 2024-05-20 NOTE — TELEPHONE ENCOUNTER
Caller: Greg Kee    Relationship: Self    Best call back number: [phone]     Requested Prescriptions:   Requested Prescriptions     Pending Prescriptions Disp Refills    NIFEdipine XL (PROCARDIA XL) 60 MG 24 hr tablet 30 tablet 1        Pharmacy where request should be sent: [address] [phone] [facility] [phone]      Last office visit with prescribing clinician: 2/28/2024   Last telemedicine visit with prescribing clinician: Visit date not found   Next office visit with prescribing clinician: 7/2/2024     Additional details provided by patient: PATIENT NEEDS THIS MEDICATION BY TOMORROW HE IS LEAVING THE COUNTRY.     Does the patient have less than a 3 day supply:   [] Yes  [x] No    Would you like a call back once the refill request has been completed: [] Yes [x] No    If the office needs to give you a call back, can they leave a voicemail: [] Yes [x] No    Omid Shannon Rep   05/20/24 09:36 EDT

## 2024-05-23 ENCOUNTER — OFFICE VISIT (OUTPATIENT)
Dept: INTERNAL MEDICINE | Facility: CLINIC | Age: 54
End: 2024-05-23
Payer: MEDICAID

## 2024-05-23 VITALS
RESPIRATION RATE: 18 BRPM | TEMPERATURE: 98.2 F | WEIGHT: 315 LBS | HEART RATE: 78 BPM | BODY MASS INDEX: 48.09 KG/M2 | DIASTOLIC BLOOD PRESSURE: 82 MMHG | SYSTOLIC BLOOD PRESSURE: 138 MMHG

## 2024-05-23 DIAGNOSIS — T81.49XA SURGICAL SITE INFECTION: ICD-10-CM

## 2024-05-23 DIAGNOSIS — Z23 ENCOUNTER FOR VACCINATION: ICD-10-CM

## 2024-05-23 DIAGNOSIS — Z96.611 STATUS POST TOTAL REPLACEMENT OF RIGHT SHOULDER: Primary | ICD-10-CM

## 2024-05-23 DIAGNOSIS — K59.03 DRUG-INDUCED CONSTIPATION: ICD-10-CM

## 2024-05-23 PROCEDURE — 1125F AMNT PAIN NOTED PAIN PRSNT: CPT | Performed by: STUDENT IN AN ORGANIZED HEALTH CARE EDUCATION/TRAINING PROGRAM

## 2024-05-23 PROCEDURE — 99214 OFFICE O/P EST MOD 30 MIN: CPT | Performed by: STUDENT IN AN ORGANIZED HEALTH CARE EDUCATION/TRAINING PROGRAM

## 2024-05-23 PROCEDURE — 3079F DIAST BP 80-89 MM HG: CPT | Performed by: STUDENT IN AN ORGANIZED HEALTH CARE EDUCATION/TRAINING PROGRAM

## 2024-05-23 PROCEDURE — 3075F SYST BP GE 130 - 139MM HG: CPT | Performed by: STUDENT IN AN ORGANIZED HEALTH CARE EDUCATION/TRAINING PROGRAM

## 2024-05-23 RX ORDER — SULFAMETHOXAZOLE AND TRIMETHOPRIM 800; 160 MG/1; MG/1
2 TABLET ORAL 2 TIMES DAILY
Qty: 28 TABLET | Refills: 0 | Status: SHIPPED | OUTPATIENT
Start: 2024-05-23 | End: 2024-05-24

## 2024-05-23 RX ORDER — AMOXICILLIN 250 MG
1 CAPSULE ORAL DAILY
Qty: 30 TABLET | Refills: 0 | Status: SHIPPED | OUTPATIENT
Start: 2024-05-23

## 2024-05-23 RX ORDER — BETAMETHASONE DIPROPIONATE 0.5 MG/G
CREAM TOPICAL
COMMUNITY
Start: 2024-05-15

## 2024-05-23 RX ORDER — POLYETHYLENE GLYCOL 3350 17 G/17G
17 POWDER, FOR SOLUTION ORAL DAILY
Qty: 225 G | Refills: 1 | Status: SHIPPED | OUTPATIENT
Start: 2024-05-23

## 2024-05-23 NOTE — PROGRESS NOTES
Follow Up Office Visit      Date: 2024   Patient Name: Greg Kee  : 1970   MRN: [id number]     Chief Complaint:    Chief Complaint   Patient presents with    Cyst       History of Present Illness: Greg Kee is a 53 y.o. male who is here today to follow up with concerns for a surgical site infection.    History of Present Illness  The patient presents for evaluation of multiple medical concerns. He is accompanied by his father.    The patient underwent a successful shoulder procedure, however, he is experiencing complications at the surgical site, characterized by hardness and associated pain. He reports no discomfort upon applying pressure to the area. The surgeon, Dr. Juarez, informed him that the muscle was restored, sutured, or inserted knots. He was not prescribed any antibiotics. He has experienced perspiration on a few occasions, which he attributes to his medication. He is scheduled for a follow-up with the orthopedic surgeon in 2024. He is planning a trip to [place] tomorrow and anticipates returning in 2 weeks. Post-surgery, he experienced constipation, which he describes as resembling rock-like stools. He was prescribed a 7-day course of steroids, which alleviated his symptoms. He has discontinued the use of Norco and has been managing his pain with 4 ibuprofen and 3 Tylenol 5 mg.      Subjective      Review of Systems:   Review of Systems   All other systems reviewed and are negative.      I have reviewed the patients family history, social history, past medical history, past surgical history and have updated it as appropriate.     Medications:     Current Outpatient Medications:     acetaminophen (Tylenol) 325 MG tablet, PRN OTC, Disp: , Rfl:     betamethasone dipropionate (DIPROSONE) 0.05 % cream, , Disp: , Rfl:     clotrimazole (LOTRIMIN) 1 % cream, Apply 1 Application topically to the appropriate area as directed 2 (Two) Times a Day., Disp: 60 g,  Rfl: 3    docusate sodium (COLACE) 100 MG capsule, Take 1 capsule by mouth 2 (Two) Times a Day., Disp: 20 capsule, Rfl: 0    gabapentin (NEURONTIN) 300 MG capsule, Take 1 capsule by mouth 4 (Four) Times a Day., Disp: , Rfl:     HYDROcodone-acetaminophen (NORCO) 7.5-325 MG per tablet, Take 1 tablet by mouth Every 6 (Six) Hours As Needed for Moderate Pain (Pain)., Disp: 24 tablet, Rfl: 0    ibuprofen (ADVIL,MOTRIN) 200 MG tablet, PRN OTC, Disp: , Rfl:     ketoconazole (NIZORAL) 2 % shampoo, APPLY TOPICALLY TO APPROPRIATE AREA AS DIRECTED TWO (2) TIMES A WEEK, Disp: 120 mL, Rfl: 1    NIFEdipine XL (PROCARDIA XL) 60 MG 24 hr tablet, Take 1 tablet by mouth Daily., Disp: 30 tablet, Rfl: 1    olmesartan (BENICAR) 40 MG tablet, Take 1 tablet by mouth Daily., Disp: 90 tablet, Rfl: 3    ondansetron (Zofran) 4 MG tablet, Take 1 tablet by mouth Every 8 (Eight) Hours As Needed for Nausea or Vomiting., Disp: 12 tablet, Rfl: 0    rosuvastatin (CRESTOR) 10 MG tablet, TAKE ONE (1) TABLET BY MOUTH ONCE DAILY, Disp: 90 tablet, Rfl: 2    polyethylene glycol (MIRALAX) 17 GM/SCOOP powder, Take 17 g by mouth Daily., Disp: 225 g, Rfl: 1    sennosides-docusate (senna-docusate sodium) 8.6-50 MG per tablet, Take 1 tablet by mouth Daily., Disp: 30 tablet, Rfl: 0    sulfamethoxazole-trimethoprim (Bactrim DS) 800-160 MG per tablet, Take 2 tablets by mouth 2 (Two) Times a Day for 7 days., Disp: 28 tablet, Rfl: 0    Tdap (BOOSTRIX) 5-2.5-18.5 LF-MCG/0.5 injection, Inject 0.5 mL into the appropriate muscle as directed by prescriber 1 (One) Time for 1 dose., Disp: 0.5 mL, Rfl: 0    Allergies:   No Known Allergies    Objective     Physical Exam: Please see above  Vital Signs:   Vitals:    05/23/24 1152   BP: 138/82   BP Location: Left arm   Patient Position: Sitting   Cuff Size: Large Adult   Pulse: 78   Resp: 18   Temp: 98.2 °F (36.8 °C)   TempSrc: Temporal   Weight: (!) 148 kg (325 lb 12.8 oz)   PainSc:   8   PainLoc: Shoulder     Body mass index  is 48.09 kg/m².          Physical Exam  Vitals and nursing note reviewed.   Constitutional:       General: He is not in acute distress.     Appearance: Normal appearance. He is obese. He is not ill-appearing or toxic-appearing.   HENT:      Nose: No congestion or rhinorrhea.   Eyes:      General:         Right eye: No discharge.         Left eye: No discharge.      Conjunctiva/sclera: Conjunctivae normal.   Pulmonary:      Effort: Pulmonary effort is normal. No respiratory distress.   Abdominal:      General: Abdomen is flat. There is no distension.   Musculoskeletal:      Cervical back: Normal range of motion.   Skin:     Coloration: Skin is not jaundiced.      Findings: Erythema and lesion (see below, concern for 2-3 cm abscess) present.   Neurological:      General: No focal deficit present.      Mental Status: He is alert. Mental status is at baseline.      Coordination: Coordination normal.      Gait: Gait normal.   Psychiatric:         Mood and Affect: Mood normal.         Behavior: Behavior normal.         Thought Content: Thought content normal.         Judgment: Judgment normal.         Procedures    Results:   Results      Labs:   Hemoglobin A1C   Date Value Ref Range Status   04/16/2024 5.30 4.80 - 5.60 % Final     TSH   Date Value Ref Range Status   03/13/2023 1.680 0.270 - 4.200 uIU/mL Final        Imaging:   No valid procedures specified.     Assessment / Plan      Assessment/Plan:   Problem List Items Addressed This Visit       Surgical site infection    Overview              Relevant Medications    sulfamethoxazole-trimethoprim (Bactrim DS) 800-160 MG per tablet    Drug-induced constipation    Relevant Medications    sennosides-docusate (senna-docusate sodium) 8.6-50 MG per tablet    polyethylene glycol (MIRALAX) 17 GM/SCOOP powder     Other Visit Diagnoses       Status post total replacement of right shoulder    -  Primary    Encounter for vaccination        Relevant Medications    Tdap (BOOSTRIX)  5-2.5-18.5 LF-MCG/0.5 injection            Assessment & Plan  1. Infection at the surgical site.  The patient presents with an abscess formation at the surgical site. A photograph of the surgical site will be taken and uploaded to the patient's chart. The patient will be initiated on an oral antibiotic regimen to facilitate appropriate coverage for MRSA and Staphylococcus infections. A consultation with the surgeon will be arranged to expedite the possibility of incision and drainage of the abscess area.    2. Constipation.  The patient will be prescribed Senna and docusate, along with a separate dose of MiraLAX. The patient is advised to continue these medications until a single soft bowel movement per day is achieved. If necessary, the MiraLAX dosage will be increased to 2 scoopfuls.    Follow-up  The patient is scheduled for a follow-up visit in 1 to 2 months for health maintenance.    Follow Up:   Return in about 6 weeks (around 7/2/2024) for Next scheduled follow up.        Patient or patient representative verbalized consent for the use of Ambient Listening during the visit with  Scooby Ochoa MD for chart documentation. 5/23/2024  12:25 EDT    Scooby Ochoa MD  [facility]

## 2024-05-24 RX ORDER — DOXYCYCLINE HYCLATE 100 MG/1
100 CAPSULE ORAL 2 TIMES DAILY
Qty: 14 CAPSULE | Refills: 0 | Status: SHIPPED | OUTPATIENT
Start: 2024-05-24

## 2024-06-19 DIAGNOSIS — I10 PRIMARY HYPERTENSION: ICD-10-CM

## 2024-06-19 RX ORDER — NIFEDIPINE 60 MG/1
TABLET, EXTENDED RELEASE ORAL
Qty: 30 TABLET | Refills: 1 | Status: SHIPPED | OUTPATIENT
Start: 2024-06-19

## 2024-07-23 DIAGNOSIS — K59.03 DRUG-INDUCED CONSTIPATION: ICD-10-CM

## 2024-07-23 RX ORDER — DOCUSATE SODIUM 50MG AND SENNOSIDES 8.6MG 8.6; 5 MG/1; MG/1
TABLET, FILM COATED ORAL
Qty: 30 TABLET | Refills: 0 | Status: SHIPPED | OUTPATIENT
Start: 2024-07-23

## 2024-09-11 ENCOUNTER — PRIOR AUTHORIZATION (OUTPATIENT)
Dept: INTERNAL MEDICINE | Facility: CLINIC | Age: 54
End: 2024-09-11

## 2024-09-11 ENCOUNTER — OFFICE VISIT (OUTPATIENT)
Dept: INTERNAL MEDICINE | Facility: CLINIC | Age: 54
End: 2024-09-11
Payer: MEDICAID

## 2024-09-11 VITALS
WEIGHT: 315 LBS | SYSTOLIC BLOOD PRESSURE: 132 MMHG | RESPIRATION RATE: 18 BRPM | BODY MASS INDEX: 49.01 KG/M2 | TEMPERATURE: 98 F | DIASTOLIC BLOOD PRESSURE: 88 MMHG | HEART RATE: 74 BPM

## 2024-09-11 DIAGNOSIS — E66.01 CLASS 3 SEVERE OBESITY DUE TO EXCESS CALORIES WITH SERIOUS COMORBIDITY AND BODY MASS INDEX (BMI) OF 45.0 TO 49.9 IN ADULT: ICD-10-CM

## 2024-09-11 DIAGNOSIS — E78.00 PURE HYPERCHOLESTEROLEMIA: ICD-10-CM

## 2024-09-11 DIAGNOSIS — I10 PRIMARY HYPERTENSION: ICD-10-CM

## 2024-09-11 DIAGNOSIS — Z13.9 ENCOUNTER FOR SCREENING: ICD-10-CM

## 2024-09-11 DIAGNOSIS — E03.8 OTHER SPECIFIED HYPOTHYROIDISM: ICD-10-CM

## 2024-09-11 DIAGNOSIS — Z12.5 SCREENING PSA (PROSTATE SPECIFIC ANTIGEN): ICD-10-CM

## 2024-09-11 DIAGNOSIS — Z00.00 HEALTHCARE MAINTENANCE: Primary | ICD-10-CM

## 2024-09-11 DIAGNOSIS — N52.9 ERECTILE DYSFUNCTION, UNSPECIFIED ERECTILE DYSFUNCTION TYPE: ICD-10-CM

## 2024-09-11 DIAGNOSIS — E88.819 INSULIN RESISTANCE: ICD-10-CM

## 2024-09-11 DIAGNOSIS — M48.062 SPINAL STENOSIS OF LUMBAR REGION WITH NEUROGENIC CLAUDICATION: ICD-10-CM

## 2024-09-11 DIAGNOSIS — T81.49XA SURGICAL SITE INFECTION: ICD-10-CM

## 2024-09-11 DIAGNOSIS — K64.0 GRADE I HEMORRHOIDS: ICD-10-CM

## 2024-09-11 DIAGNOSIS — M51.37 DEGENERATIVE DISC DISEASE AT L5-S1 LEVEL: ICD-10-CM

## 2024-09-11 DIAGNOSIS — L30.4 INTERTRIGO: ICD-10-CM

## 2024-09-11 DIAGNOSIS — K59.03 DRUG-INDUCED CONSTIPATION: ICD-10-CM

## 2024-09-11 DIAGNOSIS — J34.2 NOSE SEPTUM DEVIATION: ICD-10-CM

## 2024-09-11 PROBLEM — Z41.2 ENCOUNTER FOR CIRCUMCISION: Status: RESOLVED | Noted: 2023-07-26 | Resolved: 2024-09-11

## 2024-09-11 PROBLEM — R07.81 PLEURITIC CHEST PAIN: Status: RESOLVED | Noted: 2024-01-03 | Resolved: 2024-09-11

## 2024-09-11 LAB
ALBUMIN SERPL-MCNC: 4.4 G/DL (ref 3.5–5.2)
ALBUMIN/GLOB SERPL: 1.8 G/DL
ALP SERPL-CCNC: 74 U/L (ref 39–117)
ALT SERPL W P-5'-P-CCNC: 40 U/L (ref 1–41)
ANION GAP SERPL CALCULATED.3IONS-SCNC: 12.9 MMOL/L (ref 5–15)
AST SERPL-CCNC: 31 U/L (ref 1–40)
BASOPHILS # BLD AUTO: 0.05 10*3/MM3 (ref 0–0.2)
BASOPHILS NFR BLD AUTO: 0.7 % (ref 0–1.5)
BILIRUB SERPL-MCNC: 0.4 MG/DL (ref 0–1.2)
BUN SERPL-MCNC: 14 MG/DL (ref 6–20)
BUN/CREAT SERPL: 14.4 (ref 7–25)
CALCIUM SPEC-SCNC: 9.5 MG/DL (ref 8.6–10.5)
CHLORIDE SERPL-SCNC: 105 MMOL/L (ref 98–107)
CHOLEST SERPL-MCNC: 126 MG/DL (ref 0–200)
CO2 SERPL-SCNC: 21.1 MMOL/L (ref 22–29)
CREAT SERPL-MCNC: 0.97 MG/DL (ref 0.76–1.27)
DEPRECATED RDW RBC AUTO: 39.9 FL (ref 37–54)
EGFRCR SERPLBLD CKD-EPI 2021: 91.6 ML/MIN/1.73
EOSINOPHIL # BLD AUTO: 0.22 10*3/MM3 (ref 0–0.4)
EOSINOPHIL NFR BLD AUTO: 3.3 % (ref 0.3–6.2)
ERYTHROCYTE [DISTWIDTH] IN BLOOD BY AUTOMATED COUNT: 12.7 % (ref 12.3–15.4)
GLOBULIN UR ELPH-MCNC: 2.5 GM/DL
GLUCOSE SERPL-MCNC: 74 MG/DL (ref 65–99)
HCT VFR BLD AUTO: 40.9 % (ref 37.5–51)
HDLC SERPL-MCNC: 45 MG/DL (ref 40–60)
HGB BLD-MCNC: 13.5 G/DL (ref 13–17.7)
IMM GRANULOCYTES # BLD AUTO: 0.01 10*3/MM3 (ref 0–0.05)
IMM GRANULOCYTES NFR BLD AUTO: 0.1 % (ref 0–0.5)
LDLC SERPL CALC-MCNC: 56 MG/DL (ref 0–100)
LDLC/HDLC SERPL: 1.15 {RATIO}
LYMPHOCYTES # BLD AUTO: 2.2 10*3/MM3 (ref 0.7–3.1)
LYMPHOCYTES NFR BLD AUTO: 32.8 % (ref 19.6–45.3)
MCH RBC QN AUTO: 28.9 PG (ref 26.6–33)
MCHC RBC AUTO-ENTMCNC: 33 G/DL (ref 31.5–35.7)
MCV RBC AUTO: 87.6 FL (ref 79–97)
MONOCYTES # BLD AUTO: 0.7 10*3/MM3 (ref 0.1–0.9)
MONOCYTES NFR BLD AUTO: 10.4 % (ref 5–12)
NEUTROPHILS NFR BLD AUTO: 3.52 10*3/MM3 (ref 1.7–7)
NEUTROPHILS NFR BLD AUTO: 52.7 % (ref 42.7–76)
NRBC BLD AUTO-RTO: 0 /100 WBC (ref 0–0.2)
PLATELET # BLD AUTO: 210 10*3/MM3 (ref 140–450)
PMV BLD AUTO: 10.5 FL (ref 6–12)
POTASSIUM SERPL-SCNC: 4.3 MMOL/L (ref 3.5–5.2)
PROT SERPL-MCNC: 6.9 G/DL (ref 6–8.5)
PSA SERPL-MCNC: 0.68 NG/ML (ref 0–4)
RBC # BLD AUTO: 4.67 10*6/MM3 (ref 4.14–5.8)
SODIUM SERPL-SCNC: 139 MMOL/L (ref 136–145)
T4 SERPL-MCNC: 6.1 MCG/DL (ref 4.5–11.7)
TESTOST SERPL-MCNC: 267 NG/DL (ref 193–740)
TRIGL SERPL-MCNC: 147 MG/DL (ref 0–150)
TSH SERPL DL<=0.05 MIU/L-ACNC: 1.3 UIU/ML (ref 0.27–4.2)
VLDLC SERPL-MCNC: 25 MG/DL (ref 5–40)
WBC NRBC COR # BLD AUTO: 6.7 10*3/MM3 (ref 3.4–10.8)

## 2024-09-11 PROCEDURE — 99396 PREV VISIT EST AGE 40-64: CPT | Performed by: STUDENT IN AN ORGANIZED HEALTH CARE EDUCATION/TRAINING PROGRAM

## 2024-09-11 PROCEDURE — 80061 LIPID PANEL: CPT | Performed by: STUDENT IN AN ORGANIZED HEALTH CARE EDUCATION/TRAINING PROGRAM

## 2024-09-11 PROCEDURE — G0103 PSA SCREENING: HCPCS | Performed by: STUDENT IN AN ORGANIZED HEALTH CARE EDUCATION/TRAINING PROGRAM

## 2024-09-11 PROCEDURE — 1160F RVW MEDS BY RX/DR IN RCRD: CPT | Performed by: STUDENT IN AN ORGANIZED HEALTH CARE EDUCATION/TRAINING PROGRAM

## 2024-09-11 PROCEDURE — 83036 HEMOGLOBIN GLYCOSYLATED A1C: CPT | Performed by: STUDENT IN AN ORGANIZED HEALTH CARE EDUCATION/TRAINING PROGRAM

## 2024-09-11 PROCEDURE — 3079F DIAST BP 80-89 MM HG: CPT | Performed by: STUDENT IN AN ORGANIZED HEALTH CARE EDUCATION/TRAINING PROGRAM

## 2024-09-11 PROCEDURE — 84436 ASSAY OF TOTAL THYROXINE: CPT | Performed by: STUDENT IN AN ORGANIZED HEALTH CARE EDUCATION/TRAINING PROGRAM

## 2024-09-11 PROCEDURE — 82570 ASSAY OF URINE CREATININE: CPT | Performed by: STUDENT IN AN ORGANIZED HEALTH CARE EDUCATION/TRAINING PROGRAM

## 2024-09-11 PROCEDURE — 1126F AMNT PAIN NOTED NONE PRSNT: CPT | Performed by: STUDENT IN AN ORGANIZED HEALTH CARE EDUCATION/TRAINING PROGRAM

## 2024-09-11 PROCEDURE — 1159F MED LIST DOCD IN RCRD: CPT | Performed by: STUDENT IN AN ORGANIZED HEALTH CARE EDUCATION/TRAINING PROGRAM

## 2024-09-11 PROCEDURE — 82043 UR ALBUMIN QUANTITATIVE: CPT | Performed by: STUDENT IN AN ORGANIZED HEALTH CARE EDUCATION/TRAINING PROGRAM

## 2024-09-11 PROCEDURE — 80050 GENERAL HEALTH PANEL: CPT | Performed by: STUDENT IN AN ORGANIZED HEALTH CARE EDUCATION/TRAINING PROGRAM

## 2024-09-11 PROCEDURE — 84403 ASSAY OF TOTAL TESTOSTERONE: CPT | Performed by: STUDENT IN AN ORGANIZED HEALTH CARE EDUCATION/TRAINING PROGRAM

## 2024-09-11 PROCEDURE — 3075F SYST BP GE 130 - 139MM HG: CPT | Performed by: STUDENT IN AN ORGANIZED HEALTH CARE EDUCATION/TRAINING PROGRAM

## 2024-09-11 RX ORDER — DOCUSATE SODIUM 100 MG/1
100 CAPSULE, LIQUID FILLED ORAL 2 TIMES DAILY
Qty: 20 CAPSULE | Refills: 0 | Status: SHIPPED | OUTPATIENT
Start: 2024-09-11

## 2024-09-11 RX ORDER — POLYETHYLENE GLYCOL 3350 17 G/17G
17 POWDER, FOR SOLUTION ORAL 2 TIMES DAILY
Qty: 850 G | Refills: 3 | Status: SHIPPED | OUTPATIENT
Start: 2024-09-11

## 2024-09-11 RX ORDER — TADALAFIL 2.5 MG/1
2.5 TABLET ORAL DAILY PRN
Qty: 60 TABLET | Refills: 1 | Status: SHIPPED | OUTPATIENT
Start: 2024-09-11

## 2024-09-11 RX ORDER — CLOTRIMAZOLE 1 %
1 CREAM (GRAM) TOPICAL 2 TIMES DAILY
Qty: 60 G | Refills: 3 | Status: SHIPPED | OUTPATIENT
Start: 2024-09-11

## 2024-09-11 NOTE — TELEPHONE ENCOUNTER
This drug/product is not covered under the pharmacy benefit. Prior Authorization is not available.

## 2024-09-11 NOTE — TELEPHONE ENCOUNTER
The patient will typically have to pay for this out-of-pocket due to lack of insurance coverage for this type of medication.  Please just let the patient know about this.  Thanks.

## 2024-09-11 NOTE — PATIENT INSTRUCTIONS
Health Maintenance, Male  A healthy lifestyle and preventive care is important for your health and wellness. Ask your health care provider about what schedule of regular examinations is right for you.  What should I know about weight and diet?    Eat a Healthy Diet  Eat plenty of vegetables, fruits, whole grains, low-fat dairy products, and lean protein.  Do not eat a lot of foods high in solid fats, added sugars, or salt.     Maintain a Healthy Weight  Regular exercise can help you achieve or maintain a healthy weight. You should:  Do at least 150 minutes of exercise each week. The exercise should increase your heart rate and make you sweat (moderate-intensity exercise).  Do strength-training exercises at least twice a week.     Watch Your Levels of Cholesterol and Blood Lipids  Have your blood tested for lipids and cholesterol every 5 years starting at 35 years of age. If you are at high risk for heart disease, you should start having your blood tested when you are 20 years old. You may need to have your cholesterol levels checked more often if:  Your lipid or cholesterol levels are high.  You are older than 50 years of age.  You are at high risk for heart disease.     What should I know about cancer screening?  Many types of cancers can be detected early and may often be prevented.  Lung Cancer  You should be screened every year for lung cancer if:  You are a current smoker who has smoked for at least 30 years.  You are a former smoker who has quit within the past 15 years.  Talk to your health care provider about your screening options, when you should start screening, and how often you should be screened.     Colorectal Cancer  Routine colorectal cancer screening usually begins at 50 years of age and should be repeated every 5-10 years until you are 75 years old. You may need to be screened more often if early forms of precancerous polyps or small growths are found. Your health care provider may recommend  screening at an earlier age if you have risk factors for colon cancer.  Your health care provider may recommend using home test kits to check for hidden blood in the stool.  A small camera at the end of a tube can be used to examine your colon (sigmoidoscopy or colonoscopy). This checks for the earliest forms of colorectal cancer.     Prostate and Testicular Cancer  Depending on your age and overall health, your health care provider may do certain tests to screen for prostate and testicular cancer.  Talk to your health care provider about any symptoms or concerns you have about testicular or prostate cancer.     Skin Cancer  Check your skin from head to toe regularly.  Tell your health care provider about any new moles or changes in moles, especially if:  There is a change in a mole’s size, shape, or color.  You have a mole that is larger than a pencil eraser.  Always use sunscreen. Apply sunscreen liberally and repeat throughout the day.  Protect yourself by wearing long sleeves, pants, a wide-brimmed hat, and sunglasses when outside.     What should I know about heart disease, diabetes, and high blood pressure?  If you are 18-39 years of age, have your blood pressure checked every 3-5 years. If you are 40 years of age or older, have your blood pressure checked every year. You should have your blood pressure measured twice--once when you are at a hospital or clinic, and once when you are not at a hospital or clinic. Record the average of the two measurements. To check your blood pressure when you are not at a hospital or clinic, you can use:  An automated blood pressure machine at a pharmacy.  A home blood pressure monitor.  Talk to your health care provider about your target blood pressure.  If you are between 45-79 years old, ask your health care provider if you should take aspirin to prevent heart disease.  Have regular diabetes screenings by checking your fasting blood sugar level.  If you are at a normal  weight and have a low risk for diabetes, have this test once every three years after the age of 45.  If you are overweight and have a high risk for diabetes, consider being tested at a younger age or more often.  A one-time screening for abdominal aortic aneurysm (AAA) by ultrasound is recommended for men aged 65-75 years who are current or former smokers.  What should I know about preventing infection?  Hepatitis B  If you have a higher risk for hepatitis B, you should be screened for this virus. Talk with your health care provider to find out if you are at risk for hepatitis B infection.  Hepatitis C  Blood testing is recommended for:  Everyone born from 1945 through 1965.  Anyone with known risk factors for hepatitis C.     Sexually Transmitted Diseases (STDs)  You should be screened each year for STDs including gonorrhea and chlamydia if:  You are sexually active and are younger than 24 years of age.  You are older than 24 years of age and your health care provider tells you that you are at risk for this type of infection.  Your sexual activity has changed since you were last screened and you are at an increased risk for chlamydia or gonorrhea. Ask your health care provider if you are at risk.  Talk with your health care provider about whether you are at high risk of being infected with HIV. Your health care provider may recommend a prescription medicine to help prevent HIV infection.     What else can I do?    Schedule regular health, dental, and eye exams.  Stay current with your vaccines (immunizations).  Do not use any tobacco products, such as cigarettes, chewing tobacco, and e-cigarettes. If you need help quitting, ask your health care provider.  Limit alcohol intake to no more than 2 drinks per day. One drink equals 12 ounces of beer, 5 ounces of wine, or 1½ ounces of hard liquor.  Do not use street drugs.  Do not share needles.  Ask your health care provider for help if you need support or information  "about quitting drugs.  Tell your health care provider if you often feel depressed.  Tell your health care provider if you have ever been abused or do not feel safe at home.      This information is not intended to replace advice given to you by your health care provider. Make sure you discuss any questions you have with your health care provider.  Document Released: 06/15/2009 Document Revised: 08/16/2017 Document Reviewed: 09/20/2016  [employer] Patient Education © 2018 [employer]        DASH Eating Plan  DASH stands for Dietary Approaches to Stop Hypertension. The DASH eating plan is a healthy eating plan that has been shown to:  Reduce high blood pressure (hypertension).  Reduce your risk for type 2 diabetes, heart disease, and stroke.  Help with weight loss.  What are tips for following this plan?  Reading food labels  Check food labels for the amount of salt (sodium) per serving. Choose foods with less than 5 percent of the Daily Value of sodium. Generally, foods with less than 300 milligrams (mg) of sodium per serving fit into this eating plan.  To find whole grains, look for the word \"whole\" as the first word in the ingredient list.  Shopping  Buy products labeled as \"low-sodium\" or \"no salt added.\"  Buy fresh foods. Avoid canned foods and pre-made or frozen meals.  Cooking  Avoid adding salt when cooking. Use salt-free seasonings or herbs instead of table salt or sea salt. Check with your health care provider or pharmacist before using salt substitutes.  Do not [place] foods. Cook foods using healthy methods such as baking, boiling, grilling, roasting, and broiling instead.  Cook with heart-healthy oils, such as olive, canola, avocado, soybean, or sunflower oil.  Meal planning  Eat a balanced diet that includes:  4 or more servings of fruits and 4 or more servings of vegetables each day. Try to fill one-half of your plate with fruits and vegetables.  6-8 servings of whole grains each day.  Less than " 6 oz (170 g) of lean meat, poultry, or fish each day. A 3-oz (85-g) serving of meat is about the same size as a deck of cards. One egg equals 1 oz (28 g).  2-3 servings of low-fat dairy each day. One serving is 1 cup (237 mL).  1 serving of nuts, seeds, or beans 5 times each week.  2-3 servings of heart-healthy fats. Healthy fats called omega-3 fatty acids are found in foods such as walnuts, flaxseeds, fortified milks, and eggs. These fats are also found in cold-water fish, such as sardines, salmon, and mackerel.  Limit how much you eat of:  Canned or prepackaged foods.  Food that is high in trans fat, such as some fried foods.  Food that is high in saturated fat, such as fatty meat.  Desserts and other sweets, sugary drinks, and other foods with added sugar.  Full-fat dairy products.  Do not salt foods before eating.  Do not eat more than 4 egg yolks a week.  Try to eat at least 2 vegetarian meals a week.  Eat more home-cooked food and less restaurant, buffet, and fast food.  Lifestyle  When eating at a restaurant, ask that your food be prepared with less salt or no salt, if possible.  If you drink alcohol:  Limit how much you use to:  0-1 drink a day for women who are not pregnant.  0-2 drinks a day for men.  Be aware of how much alcohol is in your drink. In the [place] one drink equals one 12 oz bottle of beer (355 mL), one 5 oz glass of wine (148 mL), or one 1½ oz glass of hard liquor (44 mL).  General information  Avoid eating more than 2,300 mg of salt a day. If you have hypertension, you may need to reduce your sodium intake to 1,500 mg a day.  Work with your health care provider to maintain a healthy body weight or to lose weight. Ask what an ideal weight is for you.  Get at least 30 minutes of exercise that causes your heart to beat faster (aerobic exercise) most days of the week. Activities may include walking, swimming, or biking.  Work with your health care provider or dietitian to adjust your eating plan  to your individual calorie needs.  What foods should I eat?  Fruits  All fresh, dried, or frozen fruit. Canned fruit in natural juice (without added sugar).  Vegetables  Fresh or frozen vegetables (raw, steamed, roasted, or grilled). Low-sodium or reduced-sodium tomato and vegetable juice. Low-sodium or reduced-sodium tomato sauce and tomato paste. Low-sodium or reduced-sodium canned vegetables.  Grains  Whole-grain or whole-wheat bread. Whole-grain or whole-wheat pasta. Brown rice. Oatmeal. Quinoa. Bulgur. Whole-grain and low-sodium cereals. Deena bread. Low-fat, low-sodium crackers. Whole-wheat flour tortillas.  Meats and other proteins  Skinless chicken or turkey. Ground chicken or turkey. Pork with fat trimmed off. Fish and seafood. Egg whites. Dried beans, peas, or lentils. Unsalted nuts, nut butters, and seeds. Unsalted canned beans. Lean cuts of beef with fat trimmed off. Low-sodium, lean precooked or cured meat, such as sausages or meat loaves.  Dairy  Low-fat (1%) or fat-free (skim) milk. Reduced-fat, low-fat, or fat-free cheeses. Nonfat, low-sodium ricotta or cottage cheese. Low-fat or nonfat yogurt. Low-fat, low-sodium cheese.  Fats and oils  Soft margarine without trans fats. Vegetable oil. Reduced-fat, low-fat, or light mayonnaise and salad dressings (reduced-sodium). Canola, safflower, olive, avocado, soybean, and sunflower oils. Avocado.  Seasonings and condiments  Herbs. Spices. Seasoning mixes without salt.  Other foods  Unsalted popcorn and pretzels. Fat-free sweets.  The items listed above may not be a complete list of foods and beverages you can eat. Contact a dietitian for more information.  What foods should I avoid?  Fruits  Canned fruit in a light or heavy syrup. Fried fruit. Fruit in cream or butter sauce.  Vegetables  Creamed or fried vegetables. Vegetables in a cheese sauce. Regular canned vegetables (not low-sodium or reduced-sodium). Regular canned tomato sauce and paste (not low-sodium  or reduced-sodium). Regular tomato and vegetable juice (not low-sodium or reduced-sodium). Pickles. Olives.  Grains  Baked goods made with fat, such as croissants, muffins, or some breads. Dry pasta or rice meal packs.  Meats and other proteins  Fatty cuts of meat. Ribs. Fried meat. Nagel. Bologna, salami, and other precooked or cured meats, such as sausages or meat loaves. Fat from the back of a pig (fatback). Bratwurst. Salted nuts and seeds. Canned beans with added salt. Canned or smoked fish. Whole eggs or egg yolks. Chicken or turkey with skin.  Dairy  Whole or 2% milk, cream, and half-and-half. Whole or full-fat cream cheese. Whole-fat or sweetened yogurt. Full-fat cheese. Nondairy creamers. Whipped toppings. Processed cheese and cheese spreads.  Fats and oils  Butter. Stick margarine. Lard. Shortening. Ghee. Nagel fat. Tropical oils, such as coconut, palm kernel, or palm oil.  Seasonings and condiments  Onion salt, garlic salt, seasoned salt, table salt, and sea salt. Worcestershire sauce. Tartar sauce. Barbecue sauce. Teriyaki sauce. Soy sauce, including reduced-sodium. Steak sauce. Canned and packaged gravies. Fish sauce. Oyster sauce. Cocktail sauce. Store-bought horseradish. Ketchup. Mustard. Meat flavorings and tenderizers. Bouillon cubes. Hot sauces. Pre-made or packaged marinades. Pre-made or packaged taco seasonings. Relishes. Regular salad dressings.  Other foods  Salted popcorn and pretzels.  The items listed above may not be a complete list of foods and beverages you should avoid. Contact a dietitian for more information.  Where to find more information  National Heart, Lung, and Blood [place]: www.nhlbi.nih.gov  American Heart Association: www.heart.org  Academy of Nutrition and Dietetics: www.eatright.org  National Kidney Foundation: www.kidney.org  Summary  The DASH eating plan is a healthy eating plan that has been shown to reduce high blood pressure (hypertension). It may also reduce your  risk for type 2 diabetes, heart disease, and stroke.  When on the DASH eating plan, aim to eat more fresh fruits and vegetables, whole grains, lean proteins, low-fat dairy, and heart-healthy fats.  With the DASH eating plan, you should limit salt (sodium) intake to 2,300 mg a day. If you have hypertension, you may need to reduce your sodium intake to 1,500 mg a day.  Work with your health care provider or dietitian to adjust your eating plan to your individual calorie needs.  This information is not intended to replace advice given to you by your health care provider. Make sure you discuss any questions you have with your health care provider.  Document Revised: 11/20/2020 Document Reviewed: 11/20/2020  [employer] Patient Education © 2022 [employer]

## 2024-09-11 NOTE — PROGRESS NOTES
Male Physical Note      Date: 2024   Patient Name: Greg Kee  : 1970   MRN: [id number]     Chief Complaint:    Chief Complaint   Patient presents with    Hypertension     fu       History of Present Illness: Greg Kee is a 54 y.o. male who is here today for their annual health maintenance and physical.  History of Present Illness  The patient is a 54-year-old male who presents for health maintenance and chronic care management.    He reports an improvement in his surgical site infection, attributing it to the antibiotics prescribed during his last visit. He is scheduled for another surgery to correct a deviated nasal septum.    He has been diagnosed with spinal stenosis and was advised to lose at least 80 pounds before considering surgery. He experiences numbness in his legs after walking for more than 10 minutes, which resolves upon rest.    Post-shoulder surgery, he experienced constipation due to pain medication, which led to hemorrhoids. Despite using creams and docusate every other day, his condition persists. He has tried Epsom salt and MiraLAX in the past.    He mentions a need for more clotrimazole cream for his intertrigo.    He expresses concern about his prostate, given his father's history of prostate cancer.    He reports a decrease in sexual desire and difficulty maintaining an erection post-surgery.    He requests a form for a placard for his car due to his disability.    He reports no history of severe pancreatitis or thyroid cancer. He does not endorse feelings of depression or hopelessness.    FAMILY HISTORY  His father had prostate cancer.      Subjective      Review of Systems:   Review of Systems   All other systems reviewed and are negative.      Past Medical History, Social History, Family History and Care Team were all reviewed with patient and updated as appropriate.     Medications:     Current Outpatient Medications:     clotrimazole (LOTRIMIN)  1 % cream, Apply 1 Application topically to the appropriate area as directed 2 (Two) Times a Day., Disp: 60 g, Rfl: 3    docusate sodium (COLACE) 100 MG capsule, Take 1 capsule by mouth 2 (Two) Times a Day., Disp: 20 capsule, Rfl: 0    gabapentin (NEURONTIN) 300 MG capsule, Take 1 capsule by mouth 4 (Four) Times a Day., Disp: , Rfl:     HYDROcodone-acetaminophen (NORCO) 7.5-325 MG per tablet, Take 1 tablet by mouth Every 6 (Six) Hours As Needed for Moderate Pain (Pain)., Disp: 24 tablet, Rfl: 0    NIFEdipine XL (PROCARDIA XL) 60 MG 24 hr tablet, TAKE ONE (1) TABLET BY MOUTH EVERY DAY, Disp: 30 tablet, Rfl: 1    olmesartan (BENICAR) 40 MG tablet, Take 1 tablet by mouth Daily., Disp: 90 tablet, Rfl: 3    phenylephrine-cocoa Butter (Hemorrhoidal) 0.25-88.44 % suppository suppository, Insert 1 suppository into the rectum Daily As Needed for Hemorrhoids., Disp: 24 suppository, Rfl: 1    polyethylene glycol (MIRALAX) 17 GM/SCOOP powder, Take 17 g by mouth 2 (Two) Times a Day., Disp: 850 g, Rfl: 3    rosuvastatin (CRESTOR) 10 MG tablet, TAKE ONE (1) TABLET BY MOUTH ONCE DAILY, Disp: 90 tablet, Rfl: 2    Senexon-S 8.6-50 MG per tablet, TAKE ONE (1) TABLET BY MOUTH EVERY DAY, Disp: 30 tablet, Rfl: 0    tadalafil (CIALIS) 2.5 MG tablet, Take 1 tablet by mouth Daily As Needed for Erectile Dysfunction., Disp: 60 tablet, Rfl: 1    Tirzepatide-Weight Management (ZEPBOUND) 2.5 MG/0.5ML solution auto-injector, Inject 0.5 mL under the skin into the appropriate area as directed 1 (One) Time Per Week., Disp: 2 mL, Rfl: 0    Allergies:   No Known Allergies    Immunizations:    There is no immunization history on file for this patient.     Colorectal Screening: Up-to-date  Last Completed Colonoscopy            COLORECTAL CANCER SCREENING (COLOGUARD - Every 3 Years) Next due on 3/27/2026      03/27/2023  Cologuard component of Cologuard - Stool, Per Rectum                  CT for Smoker (Age 50-80, 20pk yr within last 15 years):  Up-to-date  Bone Density/DEXA (high risk): Not applicable  Hep C (Age 18-79 once): Previously negative  HIV (Age 15-65 once) : Previously negative  PSA (Over age 50, C Level Recommendation): Ordered  US Aorta (For male smokers, age 65): Not applicable  A1c: Ordered  Lipid panel: Ordered    Dermatology: pending  Ophthalmologist: pending  Dentist: pending    Tobacco Use: Low Risk  (9/11/2024)    Patient History     Smoking Tobacco Use: Never     Smokeless Tobacco Use: Never     Passive Exposure: Never       Social History     Substance and Sexual Activity   Alcohol Use Never        Social History     Substance and Sexual Activity   Drug Use Never        Diet/Physical activity: Counseled on 09/11/24    Sexual health: No concerns, contraception used    PHQ-2 Depression Screening  PHQ-9 Total Score: 0       Intimate partner violence: (Screen on initial visit, older adult with injury or evidence of neglect): No concerns  Violence can be a problem in many people's lives, so I now ask every patient about trauma or abuse they may have experienced in a relationship.  Stress/Safety - Do you feel safe in your relationship?  Afraid/Abused - Have you ever been in a relationship where you were threatened, hurt, or afraid?  Friend/Family - Are your friends aware you have been hurt?  Emergency Plan - Do you have a safe place to go and the resources you need in an emergency?    Osteoporosis: No concerns  Men: history of low trauma fracture, androgen deprivation therapy for prostate cancer, hypogonadism, primary hyperparathyroidism, intestinal disorders.     Objective     Physical Exam:  Vital Signs:   Vitals:    09/11/24 1424   BP: 132/88   BP Location: Right arm   Patient Position: Sitting   Cuff Size: Large Adult   Pulse: 74   Resp: 18   Temp: 98 °F (36.7 °C)   TempSrc: Temporal   Weight: (!) 151 kg (332 lb)   PainSc: 0-No pain     Body mass index is 49.01 kg/m².     Physical Exam  Vitals and nursing note reviewed.    Constitutional:       General: He is not in acute distress.     Appearance: Normal appearance. He is obese. He is not ill-appearing or toxic-appearing.   HENT:      Nose: No congestion or rhinorrhea.   Eyes:      General:         Right eye: No discharge.         Left eye: No discharge.      Conjunctiva/sclera: Conjunctivae normal.   Cardiovascular:      Rate and Rhythm: Normal rate and regular rhythm.      Heart sounds: Normal heart sounds. No murmur heard.     No friction rub.   Pulmonary:      Effort: Pulmonary effort is normal. No respiratory distress.      Breath sounds: Normal breath sounds. No wheezing or rhonchi.   Abdominal:      General: Abdomen is flat. Bowel sounds are normal. There is no distension.      Palpations: Abdomen is soft. There is no mass.      Tenderness: There is no abdominal tenderness. There is no guarding or rebound.   Musculoskeletal:      Cervical back: Normal range of motion.      Right lower leg: No edema.      Left lower leg: No edema.   Skin:     Findings: No lesion or rash.   Neurological:      General: No focal deficit present.      Mental Status: He is alert. Mental status is at baseline.      Coordination: Coordination normal.      Gait: Gait normal.   Psychiatric:         Mood and Affect: Mood normal.         Behavior: Behavior normal.         Thought Content: Thought content normal.         Judgment: Judgment normal.         Procedures    Assessment / Plan      Assessment/Plan:   Problem List Items Addressed This Visit       Primary hypertension    Degenerative disc disease at L5-S1 level    Other specified hypothyroidism    Relevant Orders    TSH    T4    Intertrigo    Relevant Medications    clotrimazole (LOTRIMIN) 1 % cream    Class 3 severe obesity due to excess calories with serious comorbidity and body mass index (BMI) of 45.0 to 49.9 in adult    Overview     -   Patient currently has a BMI greater than 30  -   They have comorbidities including spinal stenosis,  hyperlipidemia, hypertension   -   Patient has engaged in over 6 months of behavior modification including lifestyle changes and dietary restriction  -   They are currently enrolled in a weight management program  -   There would be contraindicated to receive alternative medication therapies based on hypertension  -   Patient has not been able to achieve appropriate weight reduction with other forms of therapy as noted above           Relevant Medications    Tirzepatide-Weight Management (ZEPBOUND) 2.5 MG/0.5ML solution auto-injector    Other Relevant Orders    Comprehensive Metabolic Panel    Pure hypercholesterolemia    Relevant Orders    Lipid Panel    Spinal stenosis of lumbar region with neurogenic claudication    Nose septum deviation    Insulin resistance    Overview     3/4/24: Homeostatic Model Assessment for Insulin Resistance > 20 indicating severe insulin resistance         Relevant Orders    Hemoglobin A1c    Microalbumin / Creatinine Urine Ratio - Urine, Clean Catch    Surgical site infection    Overview              Drug-induced constipation    Relevant Medications    polyethylene glycol (MIRALAX) 17 GM/SCOOP powder    docusate sodium (COLACE) 100 MG capsule    Erectile dysfunction    Relevant Medications    tadalafil (CIALIS) 2.5 MG tablet    Other Relevant Orders    Testosterone    CBC Auto Differential     Other Visit Diagnoses       Healthcare maintenance    -  Primary    Relevant Orders    Comprehensive Metabolic Panel    Grade I hemorrhoids        Relevant Medications    phenylephrine-cocoa Butter (Hemorrhoidal) 0.25-88.44 % suppository suppository    Screening PSA (prostate specific antigen)        Relevant Orders    PSA Screen    Encounter for screening        Relevant Orders    Ambulatory Referral to Dentistry    Ambulatory Referral to Ophthalmology          The 10-year ASCVD risk score (Kanu BILLINGSLEY, et al., 2019) is: 4.6%    Values used to calculate the score:      Age: 54 years      Sex:  Male      Is Non- : No      Diabetic: No      Tobacco smoker: No      Systolic Blood Pressure: 132 mmHg      Is BP treated: Yes      HDL Cholesterol: 60 mg/dL      Total Cholesterol: 147 mg/dL  Assessment & Plan  1. Surgical Site Infection.  The infection has resolved with the use of antibiotics. No further treatment is required at this time.    2. Spinal Stenosis.  He has been advised to lose at least 80 pounds before considering surgery due to surgical risks. Bariatric surgery options, including gastric sleeve, were discussed. Additionally, semaglutide (Wegovy) and tirzepatide (Zepbound) were recommended as injectable medications for weight loss. A prescription for Zepbound will be sent to his pharmacy. He was educated on the use of the autoinjector and advised to monitor for side effects such as nausea. If the medication is not covered by insurance, Wegovy will be considered as an alternative.    3. Intertrigo.  He is advised to continue using clotrimazole cream twice daily. Desitin (purple top) is recommended for inflamed and red skin, and Desitin (blue top) for daily prevention once the inflammation subsides.    4. Constipation.  He is currently using docusate but will increase the dosage to twice daily. MiraLAX, a capful twice daily, will be added to his regimen. Rectal suppositories will be prescribed for use once daily until symptoms improve. If constipation persists, more aggressive treatments will be considered.    5. Hemorrhoids.  Hemorrhoid cream will be prescribed for symptomatic relief. The improvement of constipation is expected to alleviate hemorrhoid symptoms.    6. Erectile Dysfunction.  Cialis will be prescribed for daily use to assist with erectile function. Additional lab testing will be conducted to rule out any underlying causes of erectile dysfunction.    7. Prostate Health.  A blood test for prostate cancer screening will be ordered as part of his health maintenance  visit. He reports no history of severe pancreatitis or thyroid cancer.    8. Health Maintenance.  His blood pressure is well-controlled. Thyroid levels and cholesterol levels will be checked. He declined the tetanus vaccine. A form for a handicap sticker will be provided. Lab testing will be conducted today.      Results    Healthcare Maintenance:  Counseling provided based on age appropriate USPSTF guidelines.       Greg Kee voices understanding and acceptance of this advice and will call back with any further questions or concerns. AVS with preventive healthcare tips printed for patient.     “Discussed risks/benefits to vaccination, reviewed components of the vaccine, discussed VIS, discussed informed consent, informed consent obtained. Patient/Parent was allowed to accept or refuse vaccine. Questions answered to satisfactory state of patient/Parent. We reviewed typical age appropriate and seasonally appropriate vaccinations. Reviewed immunization history and updated state vaccination form as needed. Patient was counseled on Tdap  Seasonal vaccines    Follow Up:   Return in about 3 months (around 12/11/2024) for Recheck.    Patient or patient representative verbalized consent for the use of Ambient Listening during the visit with  Scooby Ochoa MD for chart documentation. 9/11/2024  16:31 EDT    Scooby Ochoa MD  [facility]

## 2024-09-12 LAB
ALBUMIN UR-MCNC: <1.2 MG/DL
CREAT UR-MCNC: 169.6 MG/DL
HBA1C MFR BLD: 5.6 % (ref 4.8–5.6)
MICROALBUMIN/CREAT UR: NORMAL MG/G{CREAT}

## 2024-09-12 NOTE — TELEPHONE ENCOUNTER
Tried to reach patient no answer left voicemail to return call    RELAY:  The patient will typically have to pay for this out-of-pocket due to lack of insurance coverage for this type of medication. Please just let the patient know about this. Thanks.

## 2024-09-30 DIAGNOSIS — I10 PRIMARY HYPERTENSION: ICD-10-CM

## 2024-09-30 DIAGNOSIS — K59.03 DRUG-INDUCED CONSTIPATION: ICD-10-CM

## 2024-09-30 RX ORDER — NIFEDIPINE 60 MG/1
TABLET, EXTENDED RELEASE ORAL
Qty: 30 TABLET | Refills: 1 | Status: SHIPPED | OUTPATIENT
Start: 2024-09-30

## 2024-09-30 RX ORDER — DOCUSATE SODIUM 50MG AND SENNOSIDES 8.6MG 8.6; 5 MG/1; MG/1
TABLET, FILM COATED ORAL
Qty: 30 TABLET | Refills: 0 | Status: SHIPPED | OUTPATIENT
Start: 2024-09-30

## 2024-10-28 DIAGNOSIS — I10 PRIMARY HYPERTENSION: ICD-10-CM

## 2024-10-28 RX ORDER — NIFEDIPINE 60 MG/1
TABLET, EXTENDED RELEASE ORAL
Qty: 30 TABLET | Refills: 1 | Status: SHIPPED | OUTPATIENT
Start: 2024-10-28

## 2024-10-28 RX ORDER — OLMESARTAN MEDOXOMIL 40 MG/1
40 TABLET ORAL DAILY
Qty: 90 TABLET | Refills: 3 | Status: SHIPPED | OUTPATIENT
Start: 2024-10-28

## 2024-12-13 ENCOUNTER — OFFICE VISIT (OUTPATIENT)
Dept: INTERNAL MEDICINE | Facility: CLINIC | Age: 54
End: 2024-12-13
Payer: MEDICAID

## 2024-12-13 VITALS
DIASTOLIC BLOOD PRESSURE: 98 MMHG | TEMPERATURE: 97.5 F | BODY MASS INDEX: 53.23 KG/M2 | SYSTOLIC BLOOD PRESSURE: 132 MMHG | RESPIRATION RATE: 16 BRPM | HEART RATE: 72 BPM | WEIGHT: 315 LBS

## 2024-12-13 DIAGNOSIS — L21.9 SEBORRHEIC DERMATITIS: ICD-10-CM

## 2024-12-13 DIAGNOSIS — I10 PRIMARY HYPERTENSION: Primary | ICD-10-CM

## 2024-12-13 DIAGNOSIS — E88.819 INSULIN RESISTANCE: ICD-10-CM

## 2024-12-13 DIAGNOSIS — M48.062 SPINAL STENOSIS OF LUMBAR REGION WITH NEUROGENIC CLAUDICATION: ICD-10-CM

## 2024-12-13 DIAGNOSIS — E66.01 CLASS 3 SEVERE OBESITY DUE TO EXCESS CALORIES WITH SERIOUS COMORBIDITY AND BODY MASS INDEX (BMI) OF 50.0 TO 59.9 IN ADULT: ICD-10-CM

## 2024-12-13 DIAGNOSIS — E66.813 CLASS 3 SEVERE OBESITY DUE TO EXCESS CALORIES WITH SERIOUS COMORBIDITY AND BODY MASS INDEX (BMI) OF 50.0 TO 59.9 IN ADULT: ICD-10-CM

## 2024-12-13 DIAGNOSIS — I10 PRIMARY HYPERTENSION: ICD-10-CM

## 2024-12-13 PROCEDURE — 1160F RVW MEDS BY RX/DR IN RCRD: CPT | Performed by: STUDENT IN AN ORGANIZED HEALTH CARE EDUCATION/TRAINING PROGRAM

## 2024-12-13 PROCEDURE — 3075F SYST BP GE 130 - 139MM HG: CPT | Performed by: STUDENT IN AN ORGANIZED HEALTH CARE EDUCATION/TRAINING PROGRAM

## 2024-12-13 PROCEDURE — 1159F MED LIST DOCD IN RCRD: CPT | Performed by: STUDENT IN AN ORGANIZED HEALTH CARE EDUCATION/TRAINING PROGRAM

## 2024-12-13 PROCEDURE — 1126F AMNT PAIN NOTED NONE PRSNT: CPT | Performed by: STUDENT IN AN ORGANIZED HEALTH CARE EDUCATION/TRAINING PROGRAM

## 2024-12-13 PROCEDURE — 3080F DIAST BP >= 90 MM HG: CPT | Performed by: STUDENT IN AN ORGANIZED HEALTH CARE EDUCATION/TRAINING PROGRAM

## 2024-12-13 PROCEDURE — 99214 OFFICE O/P EST MOD 30 MIN: CPT | Performed by: STUDENT IN AN ORGANIZED HEALTH CARE EDUCATION/TRAINING PROGRAM

## 2024-12-13 PROCEDURE — 83036 HEMOGLOBIN GLYCOSYLATED A1C: CPT | Performed by: STUDENT IN AN ORGANIZED HEALTH CARE EDUCATION/TRAINING PROGRAM

## 2024-12-13 RX ORDER — NIFEDIPINE 90 MG/1
90 TABLET, EXTENDED RELEASE ORAL
Qty: 90 TABLET | Refills: 1 | Status: SHIPPED | OUTPATIENT
Start: 2024-12-13

## 2024-12-13 RX ORDER — KETOCONAZOLE 20 MG/ML
SHAMPOO, SUSPENSION TOPICAL 2 TIMES WEEKLY
Qty: 120 ML | Refills: 1 | Status: SHIPPED | OUTPATIENT
Start: 2024-12-16

## 2024-12-13 NOTE — PROGRESS NOTES
Follow Up Office Visit      Date: 2024   Patient Name: Greg Kee  : 1970   MRN: [id number]     Chief Complaint:    Chief Complaint   Patient presents with    Hypertension     3 month follow up       History of Present Illness: Greg Kee is a 54 y.o. male who is here today to follow up with chronic care management.    History of Present Illness  The patient is a 54-year-old male who presents for chronic hip management, weight management, hypertension, and dermatological issues.    Weight Management  He has been under the care of Dr. Luis Eduardo Mahmood, who prescribed Zepbound for weight loss. However, this medication was not approved by his insurance, and he is unable to afford the $1200 cost. He has been researching alternative treatments online and is seeking advice on potential options. He is considering bariatric surgery as a last resort to prevent further weight gain. He has been referred to [facility] by his shoulder specialist.  - Alleviating/Aggravating Factors: Unable to afford Zepbound due to insurance issues; considering bariatric surgery as a last resort.  - Severity: Significant enough to consider surgery.    Chronic Hip and Shoulder Pain  He reports a decrease in physical activity due to back pain and an increase in shoulder discomfort. He has been advised to consider repeat shoulder surgery but has not yet consented. His mobility is limited, preventing him from climbing stairs. 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  - Onset: Chronic issues.  - Location: Hip, back, and shoulder.  - Duration: Ongoing.  - Character: Decreased physical activity due to back pain; increased shoulder discomfort.  - Alleviating/Aggravating Factors: Advised to consider repeat shoulder surgery; limited mobility.  - Timing: Chronic, ongoing.  - Severity: Significant enough to limit mobility and consider surgery.    Dermatological Issues  He has been  experiencing white flakes on his scalp and has been using ketoconazole shampoo, which he finds beneficial. He is requesting a refill of this prescription.  - Onset: Ongoing.  - Location: Scalp.  - Character: White flakes.  - Alleviating/Aggravating Factors: Ketoconazole shampoo is beneficial.  - Severity: Requires ongoing treatment.    Hypertension  He has been monitoring his blood pressure and took his nifedipine medication at 11:00 today. He consumed two cups of coffee prior to his appointment.  - Alleviating/Aggravating Factors: Nifedipine medication; coffee consumption.  - Timing: Took medication at 11:00 today.  - Severity: Requires monitoring and medication.    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    MEDICATIONS  - Nifedipine  - Gabapentin  - Ketoconazole shampoo      Subjective      Review of Systems:   Review of Systems   All other systems reviewed and are negative.      I have reviewed the patients family history, social history, past medical history, past surgical history and have updated it as appropriate.     Medications:     Current Outpatient Medications:     clotrimazole (LOTRIMIN) 1 % cream, Apply 1 Application topically to the appropriate area as directed 2 (Two) Times a Day., Disp: 60 g, Rfl: 3    docusate sodium (COLACE) 100 MG capsule, Take 1 capsule by mouth 2 (Two) Times a Day., Disp: 20 capsule, Rfl: 0    gabapentin (NEURONTIN) 300 MG capsule, Take 1 capsule by mouth 4 (Four) Times a Day., Disp: , Rfl:     NIFEdipine XL (PROCARDIA XL) 90 MG 24 hr tablet, Take 1 tablet by mouth every night at bedtime., Disp: 90 tablet, Rfl: 1    olmesartan (BENICAR) 40 MG tablet, TAKE ONE (1) TABLET BY MOUTH DAILY, Disp: 90 tablet, Rfl: 3    phenylephrine-cocoa Butter (Hemorrhoidal) 0.25-88.44 % suppository suppository, Insert 1 suppository into the rectum Daily As Needed for Hemorrhoids., Disp: 24 suppository, Rfl: 1    polyethylene glycol (MIRALAX) 17 GM/SCOOP powder, Take 17 g by  mouth 2 (Two) Times a Day., Disp: 850 g, Rfl: 3    rosuvastatin (CRESTOR) 10 MG tablet, TAKE ONE (1) TABLET BY MOUTH ONCE DAILY, Disp: 90 tablet, Rfl: 2    Senexon-S 8.6-50 MG per tablet, TAKE ONE (1) TABLET BY MOUTH EVERY DAY, Disp: 30 tablet, Rfl: 0    HYDROcodone-acetaminophen (NORCO) 7.5-325 MG per tablet, Take 1 tablet by mouth Every 6 (Six) Hours As Needed for Moderate Pain (Pain). (Patient not taking: Reported on 12/13/2024), Disp: 24 tablet, Rfl: 0    [START ON 12/16/2024] ketoconazole (NIZORAL) 2 % shampoo, Apply  topically to the appropriate area as directed 2 (Two) Times a Week., Disp: 120 mL, Rfl: 1    tadalafil (CIALIS) 2.5 MG tablet, Take 1 tablet by mouth Daily As Needed for Erectile Dysfunction., Disp: 60 tablet, Rfl: 1    Allergies:   No Known Allergies    Objective     Physical Exam: Please see above  Vital Signs:   Vitals:    12/13/24 1443   BP: 132/98   BP Location: Right arm   Patient Position: Sitting   Cuff Size: Large Adult   Pulse: 72   Resp: 16   Temp: 97.5 °F (36.4 °C)   TempSrc: Temporal   Weight: (!) 164 kg (360 lb 9.6 oz)     Body mass index is 53.23 kg/m².          Physical Exam  Vitals and nursing note reviewed.   Constitutional:       General: He is not in acute distress.     Appearance: Normal appearance. He is obese. He is not ill-appearing or toxic-appearing.   HENT:      Nose: No congestion or rhinorrhea.   Eyes:      General:         Right eye: No discharge.         Left eye: No discharge.      Conjunctiva/sclera: Conjunctivae normal.   Pulmonary:      Effort: Pulmonary effort is normal. No respiratory distress.   Abdominal:      General: Abdomen is flat. There is no distension.   Musculoskeletal:      Cervical back: Normal range of motion.   Skin:     Coloration: Skin is not jaundiced.      Findings: No rash.   Neurological:      General: No focal deficit present.      Mental Status: He is alert. Mental status is at baseline.      Coordination: Coordination normal.      Gait:  Gait normal.   Psychiatric:         Mood and Affect: Mood normal.         Behavior: Behavior normal.         Thought Content: Thought content normal.         Judgment: Judgment normal.         Procedures    Results:   Results      Labs:   Hemoglobin A1C   Date Value Ref Range Status   09/11/2024 5.60 4.80 - 5.60 % Final     TSH   Date Value Ref Range Status   09/11/2024 1.300 0.270 - 4.200 uIU/mL Final        Imaging:   No valid procedures specified.     Assessment / Plan      Assessment/Plan:   Problem List Items Addressed This Visit       Primary hypertension - Primary    Relevant Medications    NIFEdipine XL (PROCARDIA XL) 90 MG 24 hr tablet    Other Relevant Orders    Hemoglobin A1c    Class 3 severe obesity due to excess calories with serious comorbidity and body mass index (BMI) of 50.0 to 59.9 in adult    Overview     -   Patient currently has a BMI greater than 30  -   They have comorbidities including spinal stenosis, hyperlipidemia, hypertension   -   Patient has engaged in over 6 months of behavior modification including lifestyle changes and dietary restriction  -   They are currently enrolled in a weight management program  -   There would be contraindicated to receive alternative medication therapies based on hypertension  -   Patient has not been able to achieve appropriate weight reduction with other forms of therapy as noted above           Relevant Orders    Ambulatory Referral to Bariatric Surgery    Hemoglobin A1c    Spinal stenosis of lumbar region with neurogenic claudication    Relevant Orders    Hemoglobin A1c    Seborrheic dermatitis    Relevant Medications    ketoconazole (NIZORAL) 2 % shampoo (Start on 12/16/2024)    Other Relevant Orders    Hemoglobin A1c    Insulin resistance    Overview     3/4/24: Homeostatic Model Assessment for Insulin Resistance > 20 indicating severe insulin resistance         Relevant Orders    Hemoglobin A1c       Assessment & Plan  1. Weight management:  -  Progressive weight increase, at risk of prediabetes or diabetes due to insulin resistance  - Previous prescription for Zepbound (tirzepatide) not approved by insurance  - Re-evaluation of A1c levels to determine diabetic range  - If diabetic range, consider medications like tirzepatide or semaglutide for diabetes prevention and weight loss  - Consider compounded version of tirzepatide for weight loss and back pain relief  - Referral to bariatric clinic for evaluation and potential gastric sleeve procedure    2. Hypertension:  - Slightly elevated diastolic blood pressure  - Increase nifedipine dosage to 90 mg extended release to maintain blood pressure below 140/90 and improve cardiovascular outcomes    3. Dermatological issues:  - Prescription for ketoconazole shampoo to manage scalp condition    Follow Up:   Return in about 3 months (around 3/13/2025) for Recheck.        Patient or patient representative verbalized consent for the use of Ambient Listening during the visit with  Scooby Ochoa MD for chart documentation. 12/13/2024  17:21 EST    Scooby Ochoa MD  [facility]

## 2024-12-14 LAB — HBA1C MFR BLD: 5.7 % (ref 4.8–5.6)

## 2025-01-02 DIAGNOSIS — E78.00 PURE HYPERCHOLESTEROLEMIA: ICD-10-CM

## 2025-01-02 RX ORDER — ROSUVASTATIN CALCIUM 10 MG/1
TABLET, COATED ORAL
Qty: 90 TABLET | Refills: 2 | Status: SHIPPED | OUTPATIENT
Start: 2025-01-02

## 2025-01-09 ENCOUNTER — TELEPHONE (OUTPATIENT)
Dept: INTERNAL MEDICINE | Facility: CLINIC | Age: 55
End: 2025-01-09

## 2025-01-09 NOTE — TELEPHONE ENCOUNTER
Caller: Greg Kee    Relationship to patient: Self    Best call back number: [phone]     Patient is needing: TRIZEPITIDE. PATIENT IS OUT OF INJECTIONS. PLEASE ADVISE.     [facility] - [place], [place] - [facility] [address] [facility] [place] [address] - [phone]  - [phone] FX

## 2025-01-15 ENCOUNTER — TELEPHONE (OUTPATIENT)
Dept: INTERNAL MEDICINE | Facility: CLINIC | Age: 55
End: 2025-01-15

## 2025-01-15 NOTE — TELEPHONE ENCOUNTER
I called the compound pharmacy and they stated that they have not received it yet I printed out another form and will be bringing it for signature

## 2025-01-15 NOTE — TELEPHONE ENCOUNTER
Caller: Greg Kee    Relationship: Self    Best call back number: [phone]     Requested Prescriptions:   Requested Prescriptions      No prescriptions requested or ordered in this encounter    [facility]    Pharmacy where request should be sent: [place], [place] - [facility] [address] [facility] [address] - [phone] PH - [phone] FX     Last office visit with prescribing clinician: 12/13/2024   Last telemedicine visit with prescribing clinician: Visit date not found   Next office visit with prescribing clinician: 3/13/2025     Additional details provided by patient: PATIENT IS DUE FOR NEXT INJECTION ON 01/16/25.    Does the patient have less than a 3 day supply:  [x] Yes  [] No    Would you like a call back once the refill request has been completed: [] Yes [] No    If the office needs to give you a call back, can they leave a voicemail: [] Yes [] No    Omid Senior Rep   01/15/25 11:02 EST

## 2025-02-18 ENCOUNTER — TELEPHONE (OUTPATIENT)
Dept: INTERNAL MEDICINE | Facility: CLINIC | Age: 55
End: 2025-02-18
Payer: COMMERCIAL

## 2025-02-18 NOTE — TELEPHONE ENCOUNTER
Caller: Greg Kee    Relationship: Self    Best call back number: [phone]     Which medication are you concerned about: TIRZEPATIDE    Who prescribed you this medication: YOEL    When did you start taking this medication: 2 MONTHS    What are your concerns: PATIENT STATES HE IS BURPING MORE OFTEN AND IT SMELLS REALLY BAD. HE ALSO STATES HE FEELS LIKE SOMETHING IS STUCK IN HIS THROAT. HE WOULD LIKE DR. DIALLO TO CALL HIM BACK TO DISCUSS THESE SIDE EFFECTS AND ALSO TO DISCUSS THE PROGRESS OF THIS MEDICATION. HE ALSO WOULD LIKE RECOMMENDATION FROM DR. DIALLO FOR A DOCTOR WHO [facility] AND CAN DO HIS SHOULDER NERVE TESTING.

## 2025-02-18 NOTE — TELEPHONE ENCOUNTER
Patient was advised that he would need to be seen to discuss all the issues that he is having. Patient verb good understanding and has been placed on schedule

## 2025-02-19 NOTE — TELEPHONE ENCOUNTER
Pt notified Pt of message   I am happy to discuss these concerns with him tomorrow during his appointment.  Thanks.   Good understanding verbalized.

## 2025-02-20 ENCOUNTER — OFFICE VISIT (OUTPATIENT)
Dept: INTERNAL MEDICINE | Facility: CLINIC | Age: 55
End: 2025-02-20
Payer: COMMERCIAL

## 2025-02-20 VITALS
DIASTOLIC BLOOD PRESSURE: 80 MMHG | TEMPERATURE: 97.5 F | RESPIRATION RATE: 18 BRPM | SYSTOLIC BLOOD PRESSURE: 124 MMHG | WEIGHT: 315 LBS | HEART RATE: 78 BPM | BODY MASS INDEX: 48.56 KG/M2

## 2025-02-20 DIAGNOSIS — I10 PRIMARY HYPERTENSION: ICD-10-CM

## 2025-02-20 DIAGNOSIS — E66.01 CLASS 3 SEVERE OBESITY DUE TO EXCESS CALORIES WITH SERIOUS COMORBIDITY AND BODY MASS INDEX (BMI) OF 45.0 TO 49.9 IN ADULT: ICD-10-CM

## 2025-02-20 DIAGNOSIS — M48.062 SPINAL STENOSIS OF LUMBAR REGION WITH NEUROGENIC CLAUDICATION: ICD-10-CM

## 2025-02-20 DIAGNOSIS — N52.9 ERECTILE DYSFUNCTION, UNSPECIFIED ERECTILE DYSFUNCTION TYPE: ICD-10-CM

## 2025-02-20 DIAGNOSIS — R73.03 PREDIABETES: ICD-10-CM

## 2025-02-20 DIAGNOSIS — M51.379 DEGENERATIVE DISC DISEASE AT L5-S1 LEVEL: Primary | ICD-10-CM

## 2025-02-20 DIAGNOSIS — M12.811 ROTATOR CUFF ARTHROPATHY OF BOTH SHOULDERS: ICD-10-CM

## 2025-02-20 DIAGNOSIS — E66.813 CLASS 3 SEVERE OBESITY DUE TO EXCESS CALORIES WITH SERIOUS COMORBIDITY AND BODY MASS INDEX (BMI) OF 45.0 TO 49.9 IN ADULT: ICD-10-CM

## 2025-02-20 DIAGNOSIS — R20.2 PARESTHESIAS: ICD-10-CM

## 2025-02-20 DIAGNOSIS — M12.812 ROTATOR CUFF ARTHROPATHY OF BOTH SHOULDERS: ICD-10-CM

## 2025-02-20 PROBLEM — T81.49XA SURGICAL SITE INFECTION: Status: RESOLVED | Noted: 2024-05-23 | Resolved: 2025-02-20

## 2025-02-20 PROCEDURE — 99214 OFFICE O/P EST MOD 30 MIN: CPT | Performed by: STUDENT IN AN ORGANIZED HEALTH CARE EDUCATION/TRAINING PROGRAM

## 2025-02-20 RX ORDER — NIFEDIPINE 60 MG/1
60 TABLET, EXTENDED RELEASE ORAL
Qty: 90 TABLET | Refills: 1 | Status: SHIPPED | OUTPATIENT
Start: 2025-02-20

## 2025-02-20 NOTE — PROGRESS NOTES
Follow Up Office Visit      Date: 2025   Patient Name: Greg Kee  : 1970   MRN: [id number]     Chief Complaint:    Chief Complaint   Patient presents with    Hypertension     fu    Foot Swelling       History of Present Illness: Greg Kee is a 54 y.o. male who is here today to follow up with chronic care management.    History of Present Illness  The patient is a 54-year-old male who presents for chronic care management.    Increased Swelling in Feet  He reports experiencing increased swelling in his feet, which he attributes to his blood pressure medication. He has expressed a desire to reduce the dosage of nifedipine from 90 mg to 60 mg.  - Onset: Not specified.  - Location: Feet.  - Character: Increased swelling.  - Alleviating/Aggravating Factors: Attributed to blood pressure medication (nifedipine).  - Severity: Not specified.    Concerns About Tirzepatide  He is currently on a regimen of tirzepatide, administered at a dosage of 5 mg every Thursday. He has expressed concerns about the cost of this medication and is seeking alternatives. Additionally, he reports experiencing a burning sensation in his stomach following the administration of the medication, which he attempts to alleviate by consuming toothpaste. He also experiences a feeling of fullness and bloating, particularly noticeable upon waking. He is apprehensive about potential weight gain and is questioning the safety of continuing the medication. He is also curious about the duration of treatment with this medication.  - Onset: Following administration of tirzepatide.  - Location: Stomach.  - Character: Burning sensation, feeling of fullness, and bloating.  - Alleviating/Aggravating Factors: Consuming toothpaste to alleviate burning sensation.  - Timing: Particularly noticeable upon waking.  - Severity: Concerns about potential weight gain and safety.    Erectile Dysfunction  He has been diagnosed with  erectile dysfunction and has been prescribed Cialis. However, he is uncertain about the appropriate usage of this medication and is seeking clarification. He is also concerned about potential side effects and interactions with other medications. He has expressed a preference for not taking the medication daily.  - Onset: Not specified.  - Character: Erectile dysfunction.  - Alleviating/Aggravating Factors: Concerns about side effects and interactions with other medications.  - Severity: Not specified.    Numbness in Right Arm  He has been experiencing numbness in his right arm and has been referred to Dr. Ole Mahmood for further evaluation. He is scheduled for an EMG test to assess his nerve function. He has consulted with two different clinics regarding this issue, but neither has accepted him as a patient. He is seeking assistance in finding a clinic that will accept him. He has been advised to undergo another surgery, but he is not in agreement with this recommendation.  - Onset: Not specified.  - Location: Right arm.  - Character: Numbness.  - Severity: Not specified.    Supplemental Information  He has been trying to get off gabapentin.    MEDICATIONS  - Current: nifedipine, tirzepatide, gabapentin, Cialis      Subjective      Review of Systems:   Review of Systems   All other systems reviewed and are negative.      I have reviewed the patients family history, social history, past medical history, past surgical history and have updated it as appropriate.     Medications:     Current Outpatient Medications:     clotrimazole (LOTRIMIN) 1 % cream, Apply 1 Application topically to the appropriate area as directed 2 (Two) Times a Day., Disp: 60 g, Rfl: 3    docusate sodium (COLACE) 100 MG capsule, Take 1 capsule by mouth 2 (Two) Times a Day., Disp: 20 capsule, Rfl: 0    gabapentin (NEURONTIN) 300 MG capsule, Take 1 capsule by mouth 4 (Four) Times a Day., Disp: , Rfl:     ketoconazole (NIZORAL) 2 % shampoo, Apply   topically to the appropriate area as directed 2 (Two) Times a Week., Disp: 120 mL, Rfl: 1    NIFEdipine XL (PROCARDIA XL) 60 MG 24 hr tablet, Take 1 tablet by mouth every night at bedtime., Disp: 90 tablet, Rfl: 1    olmesartan (BENICAR) 40 MG tablet, TAKE ONE (1) TABLET BY MOUTH DAILY, Disp: 90 tablet, Rfl: 3    phenylephrine-cocoa Butter (Hemorrhoidal) 0.25-88.44 % suppository suppository, Insert 1 suppository into the rectum Daily As Needed for Hemorrhoids., Disp: 24 suppository, Rfl: 1    polyethylene glycol (MIRALAX) 17 GM/SCOOP powder, Take 17 g by mouth 2 (Two) Times a Day., Disp: 850 g, Rfl: 3    rosuvastatin (CRESTOR) 10 MG tablet, TAKE ONE (1) TABLET BY MOUTH EVERY DAY, Disp: 90 tablet, Rfl: 2    Senexon-S 8.6-50 MG per tablet, TAKE ONE (1) TABLET BY MOUTH EVERY DAY, Disp: 30 tablet, Rfl: 0    tadalafil (CIALIS) 2.5 MG tablet, Take 1 tablet by mouth Daily As Needed for Erectile Dysfunction., Disp: 60 tablet, Rfl: 1    Tirzepatide-Weight Management 2.5 MG/0.5ML solution 5 mg, Methylcobalamin 30 MG/30ML solution 1 mg, Inject 5 mg under the skin into the appropriate area as directed Every 7 (Seven) Days., Disp: 1 each, Rfl: 1    Allergies:   No Known Allergies    Objective     Physical Exam: Please see above  Vital Signs:   Vitals:    02/20/25 0928   BP: 124/80   BP Location: Right arm   Patient Position: Sitting   Cuff Size: Large Adult   Pulse: 78   Resp: 18   Temp: 97.5 °F (36.4 °C)   TempSrc: Temporal   Weight: (!) 149 kg (329 lb)   PainSc: 0-No pain     Body mass index is 48.56 kg/m².          Physical Exam  Vitals and nursing note reviewed.   Constitutional:       General: He is not in acute distress.     Appearance: Normal appearance. He is obese. He is not ill-appearing or toxic-appearing.   HENT:      Nose: No congestion or rhinorrhea.   Eyes:      General:         Right eye: No discharge.         Left eye: No discharge.      Conjunctiva/sclera: Conjunctivae normal.   Pulmonary:      Effort:  Pulmonary effort is normal. No respiratory distress.   Abdominal:      General: Abdomen is flat. There is no distension.   Musculoskeletal:      Cervical back: Normal range of motion.   Skin:     Coloration: Skin is not jaundiced.      Findings: No rash.   Neurological:      General: No focal deficit present.      Mental Status: He is alert. Mental status is at baseline.      Coordination: Coordination normal.      Gait: Gait normal.   Psychiatric:         Mood and Affect: Mood normal.         Behavior: Behavior normal.         Thought Content: Thought content normal.         Judgment: Judgment normal.         Procedures    Results:   Results      Labs:   Hemoglobin A1C   Date Value Ref Range Status   12/13/2024 5.70 (H) 4.80 - 5.60 % Final     TSH   Date Value Ref Range Status   09/11/2024 1.300 0.270 - 4.200 uIU/mL Final        Imaging:   No valid procedures specified.     Assessment / Plan      Assessment/Plan:   Problem List Items Addressed This Visit       Primary hypertension    Relevant Medications    NIFEdipine XL (PROCARDIA XL) 60 MG 24 hr tablet    Degenerative disc disease at L5-S1 level - Primary    Rotator cuff arthropathy of both shoulders    Class 3 severe obesity due to excess calories with serious comorbidity and body mass index (BMI) of 45.0 to 49.9 in adult    Overview     -   Patient currently has a BMI greater than 30  -   They have comorbidities including spinal stenosis, hyperlipidemia, hypertension   -   Patient has engaged in over 6 months of behavior modification including lifestyle changes and dietary restriction  -   They are currently enrolled in a weight management program  -   There would be contraindicated to receive alternative medication therapies based on hypertension  -   Patient has not been able to achieve appropriate weight reduction with other forms of therapy as noted above           Relevant Medications    Tirzepatide-Weight Management 2.5 MG/0.5ML solution 5 mg,  Methylcobalamin 30 MG/30ML solution 1 mg    Spinal stenosis of lumbar region with neurogenic claudication    Prediabetes    Overview     3/4/24: Homeostatic Model Assessment for Insulin Resistance > 20 indicating severe insulin resistance         Erectile dysfunction     Other Visit Diagnoses       Paresthesias        Relevant Orders    EMG & Nerve Conduction Test            Assessment & Plan  1. Hypertension  - Blood pressure improved to 124/80 from over 140/90  - Improvement likely due to weight loss (BMI reduced from over 50 to 48)  - Experiencing edema, a side effect of nifedipine  - Reduce nifedipine dosage from 90 mg to 60 mg    2. Weight management  - On tirzepatide regimen contributing to weight loss  - Concerns about medication cost addressed with information on [employer] [facility] program for lower cost  - Medication safe with current medications  - Continue current dosage of tirzepatide  - Advised to take over-the-counter Tums for stomach burning sensation    3. Erectile dysfunction  - Educated on various ways to administer Cialis (daily or as needed)  - Reassured no negative effects from higher dose without changes    4. Paresthesia in the right arm  - Order EMG test to evaluate nerve conduction in right arm    Follow-up  - Patient to follow up in 2 months    Follow Up:   Return in about 2 months (around 4/20/2025) for Recheck.        Patient or patient representative verbalized consent for the use of Ambient Listening during the visit with  Scooby Ochoa MD for chart documentation. 2/20/2025  10:02 CHAYITO Ochoa MD  [facility] [employer]

## 2025-04-16 DIAGNOSIS — E66.01 CLASS 3 SEVERE OBESITY DUE TO EXCESS CALORIES WITH SERIOUS COMORBIDITY AND BODY MASS INDEX (BMI) OF 45.0 TO 49.9 IN ADULT: ICD-10-CM

## 2025-04-16 DIAGNOSIS — E66.813 CLASS 3 SEVERE OBESITY DUE TO EXCESS CALORIES WITH SERIOUS COMORBIDITY AND BODY MASS INDEX (BMI) OF 45.0 TO 49.9 IN ADULT: ICD-10-CM

## 2025-04-29 ENCOUNTER — RESULTS FOLLOW-UP (OUTPATIENT)
Dept: INTERNAL MEDICINE | Facility: CLINIC | Age: 55
End: 2025-04-29

## 2025-04-29 ENCOUNTER — OFFICE VISIT (OUTPATIENT)
Dept: INTERNAL MEDICINE | Facility: CLINIC | Age: 55
End: 2025-04-29
Payer: COMMERCIAL

## 2025-04-29 VITALS
SYSTOLIC BLOOD PRESSURE: 120 MMHG | DIASTOLIC BLOOD PRESSURE: 76 MMHG | TEMPERATURE: 99.3 F | BODY MASS INDEX: 47.32 KG/M2 | HEART RATE: 78 BPM | RESPIRATION RATE: 18 BRPM | WEIGHT: 315 LBS

## 2025-04-29 DIAGNOSIS — R73.03 PREDIABETES: Primary | ICD-10-CM

## 2025-04-29 DIAGNOSIS — M51.379 DEGENERATIVE DISC DISEASE AT L5-S1 LEVEL: ICD-10-CM

## 2025-04-29 DIAGNOSIS — N52.9 ERECTILE DYSFUNCTION, UNSPECIFIED ERECTILE DYSFUNCTION TYPE: ICD-10-CM

## 2025-04-29 DIAGNOSIS — I10 PRIMARY HYPERTENSION: ICD-10-CM

## 2025-04-29 DIAGNOSIS — L30.4 INTERTRIGO: ICD-10-CM

## 2025-04-29 DIAGNOSIS — M48.062 SPINAL STENOSIS OF LUMBAR REGION WITH NEUROGENIC CLAUDICATION: ICD-10-CM

## 2025-04-29 DIAGNOSIS — E66.01 CLASS 3 SEVERE OBESITY DUE TO EXCESS CALORIES WITH SERIOUS COMORBIDITY AND BODY MASS INDEX (BMI) OF 45.0 TO 49.9 IN ADULT: ICD-10-CM

## 2025-04-29 DIAGNOSIS — E66.813 CLASS 3 SEVERE OBESITY DUE TO EXCESS CALORIES WITH SERIOUS COMORBIDITY AND BODY MASS INDEX (BMI) OF 45.0 TO 49.9 IN ADULT: ICD-10-CM

## 2025-04-29 LAB
EXPIRATION DATE: NORMAL
HBA1C MFR BLD: 5.4 % (ref 4.5–5.7)
Lab: NORMAL

## 2025-04-29 RX ORDER — TADALAFIL 5 MG/1
5 TABLET ORAL DAILY
Qty: 12 TABLET | Refills: 3 | Status: SHIPPED | OUTPATIENT
Start: 2025-04-29

## 2025-04-29 RX ORDER — NIFEDIPINE 30 MG/1
30 TABLET, EXTENDED RELEASE ORAL
Qty: 90 TABLET | Refills: 1 | Status: SHIPPED | OUTPATIENT
Start: 2025-04-29

## 2025-04-29 NOTE — PROGRESS NOTES
Follow Up Office Visit      Date: 2025   Patient Name: Greg Kee  : 1970   MRN: [id number]     Chief Complaint:    Chief Complaint   Patient presents with    Prediabetes     fu       History of Present Illness: Grge Kee is a 54 y.o. male who is here today to follow up with chronic care management.    History of Present Illness  The patient is a 54-year-old male who presents for chronic care management.    Weight Loss and Management  He reports a significant weight loss of 40 pounds, but his weight has recently fluctuated between 312 and 316 pounds. He has been using compounded semaglutide injections for weight loss, which he feels have not been effective in the past 2 weeks. Financial constraints are causing him to reconsider continuing the treatment, and he is contemplating a 2-month break from the medication. He has 3 doses remaining of the compounded version.  - Onset: Weight loss of 40 pounds; recent ineffectiveness of semaglutide in the past 2 weeks.  - Duration: Weight fluctuation between 312 and 316 pounds; contemplating a 2-month break from medication.  - Character: Weight loss and fluctuation; perceived ineffectiveness of semaglutide.  - Alleviating/Aggravating Factors: Financial constraints causing reconsideration of treatment.    Persistent Back Pain  Persistent back pain is reported, which he believes will only improve with a reduction in weight to 215 pounds. He is scheduled for a follow-up appointment with Kristy tomorrow. Gabapentin has been prescribed for his back pain, which he finds somewhat helpful but it induces significant fatigue. He takes 1 tablet in the afternoon and 2 at night. An attempt to discontinue gabapentin for 2 weeks resulted in worsening symptoms, necessitating resumption of the medication.  - Onset: Persistent back pain.  - Duration: Ongoing; worsened during a 2-week discontinuation of gabapentin.  - Character: Persistent pain;  somewhat alleviated by gabapentin but induces fatigue.  - Alleviating/Aggravating Factors: Gabapentin provides some relief; discontinuation worsens symptoms.  - Timing: Gabapentin taken 1 tablet in the afternoon and 2 at night.    Rash  He has been using clotrimazole cream for a rash on his foot, applying it 3 times daily. The rash is not bothersome unless he refrains from sitting or driving for 2 days, at which point it begins to grow. The cream is also used for a rash in his groin area.  - Onset: Rash worsens if refraining from sitting or driving for 2 days.  - Location: Foot and groin area.  - Duration: Ongoing; managed with clotrimazole cream applied 3 times daily.  - Character: Rash grows if untreated for 2 days.  - Alleviating/Aggravating Factors: Clotrimazole cream prevents growth; refraining from sitting or driving aggravates the rash.    Erectile Dysfunction  Cialis 2.5 mg has been prescribed for erectile dysfunction, but he reports it as ineffective and is requesting a more potent medication.  - Onset: Erectile dysfunction managed with Cialis.  - Character: Ineffectiveness of Cialis 2.5 mg.  - Alleviating/Aggravating Factors: Requesting a more potent medication.    An upcoming appointment for nerve conduction studies is scheduled on 05/14/2025.      Subjective      Review of Systems:   Review of Systems   All other systems reviewed and are negative.      I have reviewed the patients family history, social history, past medical history, past surgical history and have updated it as appropriate.     Medications:     Current Outpatient Medications:     clotrimazole (LOTRIMIN) 1 % cream, Apply 1 Application topically to the appropriate area as directed 2 (Two) Times a Day., Disp: 60 g, Rfl: 3    docusate sodium (COLACE) 100 MG capsule, Take 1 capsule by mouth 2 (Two) Times a Day., Disp: 20 capsule, Rfl: 0    gabapentin (NEURONTIN) 300 MG capsule, Take 1 capsule by mouth 4 (Four) Times a Day., Disp: , Rfl:      ketoconazole (NIZORAL) 2 % shampoo, Apply  topically to the appropriate area as directed 2 (Two) Times a Week., Disp: 120 mL, Rfl: 1    NIFEdipine XL (PROCARDIA XL) 30 MG 24 hr tablet, Take 1 tablet by mouth every night at bedtime., Disp: 90 tablet, Rfl: 1    olmesartan (BENICAR) 40 MG tablet, TAKE ONE (1) TABLET BY MOUTH DAILY, Disp: 90 tablet, Rfl: 3    phenylephrine-cocoa Butter (Hemorrhoidal) 0.25-88.44 % suppository suppository, Insert 1 suppository into the rectum Daily As Needed for Hemorrhoids., Disp: 24 suppository, Rfl: 1    polyethylene glycol (MIRALAX) 17 GM/SCOOP powder, Take 17 g by mouth 2 (Two) Times a Day., Disp: 850 g, Rfl: 3    rosuvastatin (CRESTOR) 10 MG tablet, TAKE ONE (1) TABLET BY MOUTH EVERY DAY, Disp: 90 tablet, Rfl: 2    Senexon-S 8.6-50 MG per tablet, TAKE ONE (1) TABLET BY MOUTH EVERY DAY, Disp: 30 tablet, Rfl: 0    tadalafil (CIALIS) 5 MG tablet, Take 1 tablet by mouth Daily., Disp: 12 tablet, Rfl: 3    Tirzepatide-Weight Management (Zepbound) 2.5 MG/0.5ML solution, Inject 0.5 mL under the skin into the appropriate area as directed 1 (One) Time Per Week., Disp: 2 mL, Rfl: 0    Allergies:   No Known Allergies    Objective     Physical Exam: Please see above  Vital Signs:   Vitals:    04/29/25 1329   BP: 120/76   BP Location: Right arm   Patient Position: Sitting   Cuff Size: Large Adult   Pulse: 78   Resp: 18   Temp: 99.3 °F (37.4 °C)   TempSrc: Temporal   Weight: (!) 145 kg (320 lb 9.6 oz)   PainSc: 2    PainLoc: Generalized     Body mass index is 47.32 kg/m².          Physical Exam  Vitals and nursing note reviewed.   Constitutional:       General: He is not in acute distress.     Appearance: Normal appearance. He is obese. He is not ill-appearing or toxic-appearing.   HENT:      Nose: No congestion or rhinorrhea.   Eyes:      General:         Right eye: No discharge.         Left eye: No discharge.      Conjunctiva/sclera: Conjunctivae normal.   Pulmonary:      Effort: Pulmonary  effort is normal. No respiratory distress.   Abdominal:      General: Abdomen is flat. There is no distension.   Musculoskeletal:      Cervical back: Normal range of motion.   Skin:     Coloration: Skin is not jaundiced.      Findings: No rash.   Neurological:      General: No focal deficit present.      Mental Status: He is alert. Mental status is at baseline.      Coordination: Coordination normal.      Gait: Gait normal.   Psychiatric:         Mood and Affect: Mood normal.         Behavior: Behavior normal.         Thought Content: Thought content normal.         Judgment: Judgment normal.         Procedures    Results:   Results  - Labs:    - A1c: 5.7%    Labs:   Hemoglobin A1C   Date Value Ref Range Status   04/29/2025 5.4 4.5 - 5.7 % Final   12/13/2024 5.70 (H) 4.80 - 5.60 % Final     TSH   Date Value Ref Range Status   09/11/2024 1.300 0.270 - 4.200 uIU/mL Final        Imaging:   No valid procedures specified.     Assessment / Plan      Assessment/Plan:   Problem List Items Addressed This Visit       Primary hypertension    Relevant Medications    NIFEdipine XL (PROCARDIA XL) 30 MG 24 hr tablet    Degenerative disc disease at L5-S1 level    Intertrigo    Class 3 severe obesity due to excess calories with serious comorbidity and body mass index (BMI) of 45.0 to 49.9 in adult    Overview   -   Patient currently has a BMI greater than 30  -   They have comorbidities including spinal stenosis, hyperlipidemia, hypertension   -   Patient has engaged in over 6 months of behavior modification including lifestyle changes and dietary restriction  -   They are currently enrolled in a weight management program  -   There would be contraindicated to receive alternative medication therapies based on hypertension  -   Patient has not been able to achieve appropriate weight reduction with other forms of therapy as noted above           Spinal stenosis of lumbar region with neurogenic claudication    Prediabetes - Primary     Overview   3/4/24: Homeostatic Model Assessment for Insulin Resistance > 20 indicating severe insulin resistance         Relevant Orders    POC Glycosylated Hemoglobin (Hb A1C) (Completed)    Erectile dysfunction    Relevant Medications    tadalafil (CIALIS) 5 MG tablet       Assessment & Plan  1. Hypertension  - Blood pressure readings consistently within normal range, indicating effective management  - Current blood pressure: 120/76  - Discussed potential reduction of nifedipine dosage from 60 mg to 30 mg due to low normal blood pressure  - Olmesartan dosage to remain at 40 mg    2. Prediabetes  - Significant weight loss of 40 pounds expected to positively impact A1c levels  - Fingerstick test to be conducted today to monitor A1c levels  - Continue with compounded semaglutide injections for weight loss  - Consider Zepbound from the [employer] direct program if weight loss plateaus, followed by tapering or dose adjustment    3. Erectile dysfunction  - Current dosage of Cialis 2.5 mg not effective  - Increase Cialis dosage to 5 mg daily  - Discussed potential side effects of gabapentin affecting erectile function    4. Back pain  - Gabapentin helps with back pain but causes significant tiredness  - Continue gabapentin as prescribed and discuss concerns with spine specialist  - Scheduled for EMG and nerve conduction studies on 05/14/2025 to evaluate neuropathies    5. Rash  - Continue using clotrimazole cream for rash  - If rash persists despite using cream 2-3 times daily, consider referral to podiatry    Follow-up  - Follow-up in 6 months for health maintenance and additional lab work    Follow Up:   Return in about 6 months (around 10/29/2025) for Annual physical.        Patient or patient representative verbalized consent for the use of Ambient Listening during the visit with  Scooby Ochoa MD for chart documentation. 4/29/2025  16:27 EDT    Scooby Ochoa MD  [facility] [employer]

## 2025-05-14 ENCOUNTER — HOSPITAL ENCOUNTER (OUTPATIENT)
Dept: NEUROLOGY | Facility: HOSPITAL | Age: 55
Discharge: HOME | End: 2025-05-14
Admitting: STUDENT IN AN ORGANIZED HEALTH CARE EDUCATION/TRAINING PROGRAM
Payer: COMMERCIAL

## 2025-05-14 ENCOUNTER — RESULTS FOLLOW-UP (OUTPATIENT)
Dept: INTERNAL MEDICINE | Facility: CLINIC | Age: 55
End: 2025-05-14
Payer: COMMERCIAL

## 2025-05-14 DIAGNOSIS — R20.2 PARESTHESIAS: ICD-10-CM

## 2025-05-14 PROCEDURE — 95908 NRV CNDJ TST 3-4 STUDIES: CPT

## 2025-05-14 PROCEDURE — 95886 MUSC TEST DONE W/N TEST COMP: CPT

## 2025-05-14 NOTE — TELEPHONE ENCOUNTER
Tried reaching Pt, no answer    Relay  Please let the patient know that his EMG/nerve conduction study indicated no significant neuropathy and normal nerve function throughout his right arm and neck, which is great news. No additional interventions are needed based on these results and we will continue to discuss his symptoms during his upcoming appointment. Thanks.

## (undated) DEVICE — IMPLANTABLE DEVICE
Type: IMPLANTABLE DEVICE | Site: SHOULDER | Status: NON-FUNCTIONAL
Removed: 2024-04-30

## (undated) DEVICE — GLV SURG SENSICARE PI LF PF 7.0

## (undated) DEVICE — INTENDED TO SUPPORT AND MAINTAIN THE POSITION OF AN ANESTHETIZED PATIENT DURING SURGERY: Brand: ERIN BEACH CHAIR FACE MASK

## (undated) DEVICE — ANTIBACTERIAL UNDYED BRAIDED (POLYGLACTIN 910), SYNTHETIC ABSORBABLE SUTURE: Brand: COATED VICRYL

## (undated) DEVICE — 3 BONE CEMENT MIXER: Brand: MIXEVAC

## (undated) DEVICE — SYR SLP TP 10ML DISP

## (undated) DEVICE — KT PUMP INFUBLOCK MDL 2100 PMKITSOLIS

## (undated) DEVICE — TUBING, SUCTION, 1/4" X 10', STRAIGHT: Brand: MEDLINE

## (undated) DEVICE — GLV SURG PREMIERPRO MIC LTX PF SZ7.5 BRN

## (undated) DEVICE — ARM SLING: Brand: DEROYAL

## (undated) DEVICE — GLV SURG PREMIERPRO MIC LTX PF SZ8 BRN

## (undated) DEVICE — OSCILLATING TIP SAW CARTRIDGE: Brand: PRECISION FALCON

## (undated) DEVICE — BLANKT WARM LOWR/BDY 100X120CM

## (undated) DEVICE — SYS CLS SKIN PREMIERPRO EXOFINFUSION 22CM

## (undated) DEVICE — BNDG ELAS CO-FLEX SLF ADHR 4IN5YD LF STRL

## (undated) DEVICE — PATIENT RETURN ELECTRODE, SINGLE-USE, CONTACT QUALITY MONITORING, ADULT, WITH 9FT CORD, FOR PATIENTS WEIGING OVER 33LBS. (15KG): Brand: MEGADYNE

## (undated) DEVICE — COATED BRAIDED POLYESTER: Brand: TI-CRON

## (undated) DEVICE — SYR LL TP 10ML STRL

## (undated) DEVICE — GLV SURG SENSICARE PI LF PF 7.5

## (undated) DEVICE — TRAP FLD MINIVAC MEGADYNE 100ML

## (undated) DEVICE — HANDPIECE SET WITH HIGH FLOW TIP AND SUCTION TUBE: Brand: INTERPULSE

## (undated) DEVICE — GLV SURG SENSICARE PI MIC PF SZ7 LF STRL

## (undated) DEVICE — DRP SURG U/DRP INVISISHIELD PA 48X52IN

## (undated) DEVICE — PK MAJ SHLDR SPLT 10

## (undated) DEVICE — TBG PENCL TELESCP MEGADYNE SMOKE EVAC 10FT

## (undated) DEVICE — GLV SURG PREMIERPRO MIC LTX PF SZ7 BRN

## (undated) DEVICE — PULLOVER TOGA, 2X LARGE: Brand: FLYTE, SURGICOOL

## (undated) DEVICE — GLV SURG PREMIERPRO GAMMEX NEOPRN PF SZ8 GRN